# Patient Record
Sex: FEMALE | Race: WHITE | Employment: UNEMPLOYED | URBAN - METROPOLITAN AREA
[De-identification: names, ages, dates, MRNs, and addresses within clinical notes are randomized per-mention and may not be internally consistent; named-entity substitution may affect disease eponyms.]

---

## 2017-02-23 ENCOUNTER — TELEPHONE (OUTPATIENT)
Dept: FAMILY MEDICINE CLINIC | Age: 42
End: 2017-02-23

## 2017-02-24 ENCOUNTER — OFFICE VISIT (OUTPATIENT)
Dept: FAMILY MEDICINE CLINIC | Age: 42
End: 2017-02-24

## 2017-02-24 VITALS
SYSTOLIC BLOOD PRESSURE: 142 MMHG | OXYGEN SATURATION: 98 % | HEIGHT: 64 IN | DIASTOLIC BLOOD PRESSURE: 96 MMHG | HEART RATE: 103 BPM | WEIGHT: 227 LBS | BODY MASS INDEX: 38.76 KG/M2

## 2017-02-24 DIAGNOSIS — B37.31 VAGINAL YEAST INFECTION: ICD-10-CM

## 2017-02-24 DIAGNOSIS — E78.00 PURE HYPERCHOLESTEROLEMIA: ICD-10-CM

## 2017-02-24 DIAGNOSIS — E11.8 TYPE 2 DIABETES MELLITUS WITH COMPLICATION, UNSPECIFIED LONG TERM INSULIN USE STATUS: Primary | ICD-10-CM

## 2017-02-24 DIAGNOSIS — I10 ESSENTIAL HYPERTENSION: ICD-10-CM

## 2017-02-24 DIAGNOSIS — F33.0 MILD EPISODE OF RECURRENT MAJOR DEPRESSIVE DISORDER (HCC): ICD-10-CM

## 2017-02-24 LAB — HBA1C MFR BLD: 12.5 %

## 2017-02-24 PROCEDURE — 99215 OFFICE O/P EST HI 40 MIN: CPT | Performed by: NURSE PRACTITIONER

## 2017-02-24 PROCEDURE — 83036 HEMOGLOBIN GLYCOSYLATED A1C: CPT | Performed by: NURSE PRACTITIONER

## 2017-02-24 RX ORDER — LANCETS
1 EACH MISCELLANEOUS DAILY
Qty: 100 EACH | Refills: 11 | Status: SHIPPED | OUTPATIENT
Start: 2017-02-24 | End: 2019-01-17

## 2017-02-24 RX ORDER — FLUCONAZOLE 150 MG/1
150 TABLET ORAL ONCE
Qty: 1 TABLET | Refills: 0 | Status: SHIPPED | OUTPATIENT
Start: 2017-02-24 | End: 2017-02-24

## 2017-02-24 RX ORDER — ATORVASTATIN CALCIUM 20 MG/1
20 TABLET, FILM COATED ORAL DAILY
Qty: 30 TABLET | Refills: 5 | Status: SHIPPED | OUTPATIENT
Start: 2017-02-24 | End: 2017-03-31 | Stop reason: SDDI

## 2017-02-24 RX ORDER — LISINOPRIL 20 MG/1
20 TABLET ORAL DAILY
Qty: 30 TABLET | Refills: 5 | Status: SHIPPED | OUTPATIENT
Start: 2017-02-24 | End: 2017-03-31 | Stop reason: SDDI

## 2017-02-24 RX ORDER — BLOOD-GLUCOSE METER
1 KIT MISCELLANEOUS ONCE
Qty: 1 KIT | Refills: 0 | Status: SHIPPED | OUTPATIENT
Start: 2017-02-24 | End: 2019-01-17 | Stop reason: SDUPTHER

## 2017-02-27 DIAGNOSIS — E78.00 PURE HYPERCHOLESTEROLEMIA: ICD-10-CM

## 2017-02-27 DIAGNOSIS — E11.8 TYPE 2 DIABETES MELLITUS WITH COMPLICATION, UNSPECIFIED LONG TERM INSULIN USE STATUS: ICD-10-CM

## 2017-02-27 LAB
A/G RATIO: 1.1 (ref 1.1–2.2)
ALBUMIN SERPL-MCNC: 3.3 G/DL (ref 3.4–5)
ALP BLD-CCNC: 95 U/L (ref 40–129)
ALT SERPL-CCNC: 17 U/L (ref 10–40)
ANION GAP SERPL CALCULATED.3IONS-SCNC: 13 MMOL/L (ref 3–16)
AST SERPL-CCNC: 13 U/L (ref 15–37)
BILIRUB SERPL-MCNC: 0.4 MG/DL (ref 0–1)
BUN BLDV-MCNC: 17 MG/DL (ref 7–20)
CALCIUM SERPL-MCNC: 9.1 MG/DL (ref 8.3–10.6)
CHLORIDE BLD-SCNC: 102 MMOL/L (ref 99–110)
CHOLESTEROL, TOTAL: 251 MG/DL (ref 0–199)
CO2: 24 MMOL/L (ref 21–32)
CREAT SERPL-MCNC: 0.7 MG/DL (ref 0.6–1.1)
CREATININE URINE: 82.2 MG/DL (ref 28–259)
GFR AFRICAN AMERICAN: >60
GFR NON-AFRICAN AMERICAN: >60
GLOBULIN: 2.9 G/DL
GLUCOSE BLD-MCNC: 275 MG/DL (ref 70–99)
HDLC SERPL-MCNC: 50 MG/DL (ref 40–60)
LDL CHOLESTEROL CALCULATED: 147 MG/DL
MICROALBUMIN UR-MCNC: 394.4 MG/DL
MICROALBUMIN/CREAT UR-RTO: 4798.1 MG/G (ref 0–30)
POTASSIUM SERPL-SCNC: 4.4 MMOL/L (ref 3.5–5.1)
SODIUM BLD-SCNC: 139 MMOL/L (ref 136–145)
TOTAL PROTEIN: 6.2 G/DL (ref 6.4–8.2)
TRIGL SERPL-MCNC: 272 MG/DL (ref 0–150)
VLDLC SERPL CALC-MCNC: 54 MG/DL

## 2017-02-28 ENCOUNTER — TELEPHONE (OUTPATIENT)
Dept: FAMILY MEDICINE CLINIC | Age: 42
End: 2017-02-28

## 2017-03-31 ENCOUNTER — OFFICE VISIT (OUTPATIENT)
Dept: FAMILY MEDICINE CLINIC | Age: 42
End: 2017-03-31

## 2017-03-31 VITALS
SYSTOLIC BLOOD PRESSURE: 168 MMHG | WEIGHT: 224 LBS | BODY MASS INDEX: 37.32 KG/M2 | HEART RATE: 101 BPM | DIASTOLIC BLOOD PRESSURE: 98 MMHG | HEIGHT: 65 IN | OXYGEN SATURATION: 98 %

## 2017-03-31 DIAGNOSIS — Z79.4 TYPE 2 DIABETES MELLITUS WITH COMPLICATION, WITH LONG-TERM CURRENT USE OF INSULIN (HCC): ICD-10-CM

## 2017-03-31 DIAGNOSIS — E11.8 TYPE 2 DIABETES MELLITUS WITH COMPLICATION, WITH LONG-TERM CURRENT USE OF INSULIN (HCC): ICD-10-CM

## 2017-03-31 DIAGNOSIS — A60.04 HERPES SIMPLEX VULVOVAGINITIS: ICD-10-CM

## 2017-03-31 DIAGNOSIS — F33.0 MILD EPISODE OF RECURRENT MAJOR DEPRESSIVE DISORDER (HCC): ICD-10-CM

## 2017-03-31 DIAGNOSIS — E78.2 MIXED HYPERLIPIDEMIA: ICD-10-CM

## 2017-03-31 DIAGNOSIS — I10 ESSENTIAL HYPERTENSION: Primary | ICD-10-CM

## 2017-03-31 LAB
ANION GAP SERPL CALCULATED.3IONS-SCNC: 14 MMOL/L (ref 3–16)
BUN BLDV-MCNC: 12 MG/DL (ref 7–20)
CALCIUM SERPL-MCNC: 9.3 MG/DL (ref 8.3–10.6)
CHLORIDE BLD-SCNC: 100 MMOL/L (ref 99–110)
CO2: 20 MMOL/L (ref 21–32)
CREAT SERPL-MCNC: 0.5 MG/DL (ref 0.6–1.1)
CREATININE URINE: 44.2 MG/DL (ref 28–259)
GFR AFRICAN AMERICAN: >60
GFR NON-AFRICAN AMERICAN: >60
GLUCOSE BLD-MCNC: 285 MG/DL (ref 70–99)
MICROALBUMIN UR-MCNC: 149.4 MG/DL
MICROALBUMIN/CREAT UR-RTO: 3380.1 MG/G (ref 0–30)
POTASSIUM SERPL-SCNC: 4.5 MMOL/L (ref 3.5–5.1)
SODIUM BLD-SCNC: 134 MMOL/L (ref 136–145)

## 2017-03-31 PROCEDURE — 99214 OFFICE O/P EST MOD 30 MIN: CPT | Performed by: NURSE PRACTITIONER

## 2017-03-31 RX ORDER — METOPROLOL TARTRATE AND HYDROCHLOROTHIAZIDE 50; 25 MG/1; MG/1
1 TABLET ORAL DAILY
Qty: 30 TABLET | Refills: 3 | Status: SHIPPED | OUTPATIENT
Start: 2017-03-31 | End: 2017-03-31 | Stop reason: CLARIF

## 2017-03-31 RX ORDER — ESCITALOPRAM OXALATE 5 MG/1
5 TABLET ORAL DAILY
COMMUNITY
End: 2018-08-28 | Stop reason: SDUPTHER

## 2017-03-31 RX ORDER — VALACYCLOVIR HYDROCHLORIDE 500 MG/1
500 TABLET, FILM COATED ORAL 2 TIMES DAILY PRN
Qty: 30 TABLET | Refills: 2 | Status: SHIPPED | OUTPATIENT
Start: 2017-03-31 | End: 2018-08-23 | Stop reason: SDUPTHER

## 2017-03-31 RX ORDER — COLESEVELAM HYDROCHLORIDE 3.75 G/1
3.75 POWDER, FOR SUSPENSION ORAL DAILY
Qty: 30 EACH | Refills: 3 | Status: SHIPPED | OUTPATIENT
Start: 2017-03-31 | End: 2017-07-24 | Stop reason: SDUPTHER

## 2017-03-31 RX ORDER — METOPROLOL SUCCINATE AND HYDROCHLOROTHIAZIDE 12.5; 5 MG/1; MG/1
1 TABLET ORAL DAILY
Qty: 30 TABLET | Refills: 3 | Status: SHIPPED | OUTPATIENT
Start: 2017-03-31 | End: 2017-07-24

## 2017-04-01 LAB
ESTIMATED AVERAGE GLUCOSE: 277.6 MG/DL
HBA1C MFR BLD: 11.3 %

## 2017-04-06 RX ORDER — METOPROLOL TARTRATE 50 MG/1
50 TABLET, FILM COATED ORAL DAILY
Qty: 30 TABLET | Refills: 3 | Status: SHIPPED | OUTPATIENT
Start: 2017-04-06 | End: 2017-07-24 | Stop reason: SDUPTHER

## 2017-04-06 RX ORDER — HYDROCHLOROTHIAZIDE 25 MG/1
12.5 TABLET ORAL DAILY
Qty: 30 TABLET | Refills: 3 | Status: SHIPPED | OUTPATIENT
Start: 2017-04-06 | End: 2017-07-24 | Stop reason: SDUPTHER

## 2017-07-24 ENCOUNTER — OFFICE VISIT (OUTPATIENT)
Dept: FAMILY MEDICINE CLINIC | Age: 42
End: 2017-07-24

## 2017-07-24 VITALS
OXYGEN SATURATION: 99 % | SYSTOLIC BLOOD PRESSURE: 138 MMHG | WEIGHT: 217 LBS | BODY MASS INDEX: 36.11 KG/M2 | HEART RATE: 98 BPM | DIASTOLIC BLOOD PRESSURE: 96 MMHG

## 2017-07-24 DIAGNOSIS — F31.70 BIPOLAR DISORDER IN FULL REMISSION, MOST RECENT EPISODE UNSPECIFIED TYPE (HCC): ICD-10-CM

## 2017-07-24 DIAGNOSIS — F41.9 ANXIETY: ICD-10-CM

## 2017-07-24 DIAGNOSIS — I10 ESSENTIAL HYPERTENSION: ICD-10-CM

## 2017-07-24 DIAGNOSIS — E78.2 MIXED HYPERLIPIDEMIA: ICD-10-CM

## 2017-07-24 DIAGNOSIS — E78.00 PURE HYPERCHOLESTEROLEMIA: ICD-10-CM

## 2017-07-24 DIAGNOSIS — Z79.4 TYPE 2 DIABETES MELLITUS WITH COMPLICATION, WITH LONG-TERM CURRENT USE OF INSULIN (HCC): Primary | ICD-10-CM

## 2017-07-24 DIAGNOSIS — E11.8 TYPE 2 DIABETES MELLITUS WITH COMPLICATION, UNSPECIFIED LONG TERM INSULIN USE STATUS: ICD-10-CM

## 2017-07-24 DIAGNOSIS — Z30.015 ENCOUNTER FOR INITIAL PRESCRIPTION OF VAGINAL RING HORMONAL CONTRACEPTIVE: ICD-10-CM

## 2017-07-24 DIAGNOSIS — E11.8 TYPE 2 DIABETES MELLITUS WITH COMPLICATION, WITH LONG-TERM CURRENT USE OF INSULIN (HCC): Primary | ICD-10-CM

## 2017-07-24 LAB — HBA1C MFR BLD: 7.3 %

## 2017-07-24 PROCEDURE — 83036 HEMOGLOBIN GLYCOSYLATED A1C: CPT | Performed by: NURSE PRACTITIONER

## 2017-07-24 PROCEDURE — 99214 OFFICE O/P EST MOD 30 MIN: CPT | Performed by: NURSE PRACTITIONER

## 2017-07-24 RX ORDER — LISINOPRIL 10 MG/1
10 TABLET ORAL
COMMUNITY
Start: 2017-06-28 | End: 2017-07-24

## 2017-07-24 RX ORDER — ASPIRIN 81 MG/1
81 TABLET ORAL DAILY
Qty: 30 TABLET | Refills: 3 | Status: SHIPPED | OUTPATIENT
Start: 2017-07-24 | End: 2019-01-17 | Stop reason: SDUPTHER

## 2017-07-24 RX ORDER — ETONOGESTREL AND ETHINYL ESTRADIOL 11.7; 2.7 MG/1; MG/1
1 INSERT, EXTENDED RELEASE VAGINAL
Qty: 3 EACH | Refills: 3 | Status: SHIPPED | OUTPATIENT
Start: 2017-07-24 | End: 2019-01-17 | Stop reason: ALTCHOICE

## 2017-07-24 RX ORDER — HYDROCHLOROTHIAZIDE 25 MG/1
12.5 TABLET ORAL DAILY
Qty: 30 TABLET | Refills: 3 | Status: SHIPPED | OUTPATIENT
Start: 2017-07-24 | End: 2018-02-28 | Stop reason: SDUPTHER

## 2017-07-24 RX ORDER — METOPROLOL TARTRATE 50 MG/1
50 TABLET, FILM COATED ORAL DAILY
Qty: 30 TABLET | Refills: 3 | Status: SHIPPED | OUTPATIENT
Start: 2017-07-24 | End: 2018-02-28 | Stop reason: SDUPTHER

## 2017-07-24 RX ORDER — COLESEVELAM HYDROCHLORIDE 3.75 G/1
3.75 POWDER, FOR SUSPENSION ORAL DAILY
Qty: 30 EACH | Refills: 3 | Status: SHIPPED | OUTPATIENT
Start: 2017-07-24 | End: 2018-02-28 | Stop reason: SDUPTHER

## 2018-02-28 ENCOUNTER — OFFICE VISIT (OUTPATIENT)
Dept: FAMILY MEDICINE CLINIC | Age: 43
End: 2018-02-28

## 2018-02-28 VITALS
HEIGHT: 65 IN | OXYGEN SATURATION: 98 % | RESPIRATION RATE: 10 BRPM | WEIGHT: 220.8 LBS | DIASTOLIC BLOOD PRESSURE: 90 MMHG | BODY MASS INDEX: 36.79 KG/M2 | HEART RATE: 98 BPM | SYSTOLIC BLOOD PRESSURE: 182 MMHG

## 2018-02-28 DIAGNOSIS — F43.10 PTSD (POST-TRAUMATIC STRESS DISORDER): ICD-10-CM

## 2018-02-28 DIAGNOSIS — E78.2 MIXED HYPERLIPIDEMIA: ICD-10-CM

## 2018-02-28 DIAGNOSIS — E11.8 TYPE 2 DIABETES MELLITUS WITH COMPLICATION, UNSPECIFIED LONG TERM INSULIN USE STATUS: ICD-10-CM

## 2018-02-28 DIAGNOSIS — F31.70 BIPOLAR DISORDER IN FULL REMISSION, MOST RECENT EPISODE UNSPECIFIED TYPE (HCC): ICD-10-CM

## 2018-02-28 DIAGNOSIS — G62.9 NEUROPATHY: ICD-10-CM

## 2018-02-28 DIAGNOSIS — G43.719 INTRACTABLE CHRONIC MIGRAINE WITHOUT AURA AND WITHOUT STATUS MIGRAINOSUS: ICD-10-CM

## 2018-02-28 DIAGNOSIS — Z79.4 TYPE 2 DIABETES MELLITUS WITH COMPLICATION, WITH LONG-TERM CURRENT USE OF INSULIN (HCC): ICD-10-CM

## 2018-02-28 DIAGNOSIS — E11.8 TYPE 2 DIABETES MELLITUS WITH COMPLICATION, WITH LONG-TERM CURRENT USE OF INSULIN (HCC): ICD-10-CM

## 2018-02-28 DIAGNOSIS — I10 ESSENTIAL HYPERTENSION: Primary | ICD-10-CM

## 2018-02-28 LAB — HBA1C MFR BLD: 12.1 %

## 2018-02-28 PROCEDURE — 96372 THER/PROPH/DIAG INJ SC/IM: CPT | Performed by: NURSE PRACTITIONER

## 2018-02-28 PROCEDURE — 83036 HEMOGLOBIN GLYCOSYLATED A1C: CPT | Performed by: NURSE PRACTITIONER

## 2018-02-28 PROCEDURE — G8484 FLU IMMUNIZE NO ADMIN: HCPCS | Performed by: NURSE PRACTITIONER

## 2018-02-28 PROCEDURE — G8417 CALC BMI ABV UP PARAM F/U: HCPCS | Performed by: NURSE PRACTITIONER

## 2018-02-28 PROCEDURE — 3046F HEMOGLOBIN A1C LEVEL >9.0%: CPT | Performed by: NURSE PRACTITIONER

## 2018-02-28 PROCEDURE — 99215 OFFICE O/P EST HI 40 MIN: CPT | Performed by: NURSE PRACTITIONER

## 2018-02-28 PROCEDURE — 4004F PT TOBACCO SCREEN RCVD TLK: CPT | Performed by: NURSE PRACTITIONER

## 2018-02-28 PROCEDURE — G8427 DOCREV CUR MEDS BY ELIG CLIN: HCPCS | Performed by: NURSE PRACTITIONER

## 2018-02-28 RX ORDER — METOPROLOL TARTRATE 50 MG/1
50 TABLET, FILM COATED ORAL 2 TIMES DAILY
Qty: 60 TABLET | Refills: 3 | Status: SHIPPED | OUTPATIENT
Start: 2018-02-28 | End: 2018-08-28

## 2018-02-28 RX ORDER — GABAPENTIN 100 MG/1
100 CAPSULE ORAL 2 TIMES DAILY
Qty: 60 CAPSULE | Refills: 0 | Status: SHIPPED | OUTPATIENT
Start: 2018-02-28 | End: 2018-04-26 | Stop reason: SDUPTHER

## 2018-02-28 RX ORDER — HYDROCHLOROTHIAZIDE 25 MG/1
25 TABLET ORAL DAILY
Qty: 30 TABLET | Refills: 3 | Status: SHIPPED | OUTPATIENT
Start: 2018-02-28 | End: 2018-08-28 | Stop reason: SDUPTHER

## 2018-02-28 RX ORDER — KETOROLAC TROMETHAMINE 30 MG/ML
60 INJECTION, SOLUTION INTRAMUSCULAR; INTRAVENOUS ONCE
Status: COMPLETED | OUTPATIENT
Start: 2018-02-28 | End: 2018-02-28

## 2018-02-28 RX ORDER — COLESEVELAM HYDROCHLORIDE 3.75 G/1
3.75 POWDER, FOR SUSPENSION ORAL DAILY
Qty: 30 EACH | Refills: 3 | Status: SHIPPED | OUTPATIENT
Start: 2018-02-28 | End: 2018-08-28

## 2018-02-28 RX ADMIN — KETOROLAC TROMETHAMINE 60 MG: 30 INJECTION, SOLUTION INTRAMUSCULAR; INTRAVENOUS at 15:18

## 2018-02-28 ASSESSMENT — ENCOUNTER SYMPTOMS
CHEST TIGHTNESS: 0
VOMITING: 0
BLOOD IN STOOL: 0
SINUS PRESSURE: 0
NAUSEA: 0
EYE REDNESS: 0
ABDOMINAL DISTENTION: 0
BACK PAIN: 0
DIARRHEA: 0
COUGH: 0
ABDOMINAL PAIN: 0
WHEEZING: 0
APNEA: 0
SHORTNESS OF BREATH: 0
EYE PAIN: 0
RHINORRHEA: 0
CONSTIPATION: 0

## 2018-02-28 NOTE — PROGRESS NOTES
Social History Main Topics    Smoking status: Light Tobacco Smoker    Smokeless tobacco: Never Used      Comment: occasionally    Alcohol use 0.0 oz/week      Comment: socially    Drug use: No    Sexual activity: Not Currently     Partners: Male     Other Topics Concern    Not on file     Social History Narrative    2016    Works at [employer]        2015    . [place] surfing. Lost job after mom passed away 2015. Has 3 living kids. Child  in - born . Hx of 13 miscarriages. Hx of working as a  at [employer] including solutions. Family History   Problem Relation Age of Onset    Mental Illness Mother     Diabetes Mother     Mental Illness Father     Diabetes Father     Diabetes Maternal Grandmother     Mental Illness Paternal Grandmother     Diabetes Paternal Grandmother        Current Outpatient Prescriptions   Medication Sig Dispense Refill    Acetaminophen (TYLENOL PO) Take by mouth      Ibuprofen (MOTRIN PO) Take by mouth      hydrochlorothiazide (HYDRODIURIL) 25 MG tablet Take 1 tablet by mouth daily 30 tablet 3    metoprolol tartrate (LOPRESSOR) 50 MG tablet Take 1 tablet by mouth 2 times daily 60 tablet 3    colesevelam (WELCHOL) 3.75 g PACK powder Take 1 packet by mouth daily 30 each 3    gabapentin (NEURONTIN) 100 MG capsule Take 1 capsule by mouth 2 times daily for 30 days.  60 capsule 0    metFORMIN (GLUCOPHAGE) 1000 MG tablet Take 1 tablet by mouth 2 times daily (with meals) 60 tablet 3    SITagliptin (JANUVIA) 100 MG tablet Take 1 tablet by mouth daily 30 tablet 3    escitalopram (LEXAPRO) 5 MG tablet Take 5 mg by mouth daily      valACYclovir (VALTREX) 500 MG tablet Take 1 tablet by mouth 2 times daily as needed (herpes out break) 30 tablet 2    KROGER LANCETS MISC 1 each by Does not apply route daily 100 each 11    glucose monitoring kit (FREESTYLE) monitoring kit 1 each by Does not apply route once for 1 Negative for agitation, behavioral problems and confusion. The patient is not nervous/anxious. BP (!) 182/90 (Site: Left Arm, Position: Sitting, Cuff Size: Large Adult)   Pulse 98   Resp 10   Ht 5' 5\" (1.651 m)   Wt 220 lb 12.8 oz (100.2 kg)   LMP 02/07/2018 (Approximate)   SpO2 98%   BMI 36.74 kg/m²     Physical Exam   Constitutional: She is oriented to person, place, and time. She appears well-developed and well-nourished. No distress. HENT:   Head: Normocephalic and atraumatic. Mouth/Throat: No oropharyngeal exudate. Eyes: Conjunctivae are normal. No scleral icterus. Neck: Normal range of motion. Neck supple. No tracheal deviation present. Cardiovascular: Normal rate, regular rhythm, normal heart sounds and intact distal pulses. Exam reveals no gallop and no friction rub. No murmur heard. Pulmonary/Chest: Effort normal and breath sounds normal. No respiratory distress. She has no wheezes. She has no rales. She exhibits no tenderness. Abdominal: Soft. Bowel sounds are normal. She exhibits no distension. There is no tenderness. Musculoskeletal: Normal range of motion. She exhibits no edema, tenderness or deformity. Lymphadenopathy:     She has no cervical adenopathy. Neurological: She is alert and oriented to person, place, and time. Coordination normal.   Abnormal monofilament bilateral feet. No sores or callus. Tenderness to touch     Skin: Skin is warm and dry. No rash noted. She is not diaphoretic. No erythema. No pallor. Psychiatric: She has a normal mood and affect. Thought content normal.       Diagnosis  Assessment and Plan  1. Essential hypertension  Uncontrolled. Restart medications. Labs. Compliance discussed in detail.   - COMPREHENSIVE METABOLIC PANEL; Future  - hydrochlorothiazide (HYDRODIURIL) 25 MG tablet; Take 1 tablet by mouth daily  Dispense: 30 tablet; Refill: 3  - metoprolol tartrate (LOPRESSOR) 50 MG tablet;  Take 1 tablet by mouth 2 times daily

## 2018-04-26 ENCOUNTER — TELEPHONE (OUTPATIENT)
Dept: FAMILY MEDICINE CLINIC | Age: 43
End: 2018-04-26

## 2018-04-26 DIAGNOSIS — G62.9 NEUROPATHY: ICD-10-CM

## 2018-04-26 RX ORDER — FLUCONAZOLE 150 MG/1
150 TABLET ORAL ONCE
Qty: 2 TABLET | Refills: 0 | Status: SHIPPED | OUTPATIENT
Start: 2018-04-26 | End: 2018-04-26

## 2018-04-27 RX ORDER — GABAPENTIN 100 MG/1
CAPSULE ORAL
Qty: 60 CAPSULE | Refills: 0 | Status: SHIPPED | OUTPATIENT
Start: 2018-04-27 | End: 2019-06-13

## 2018-05-01 ENCOUNTER — TELEPHONE (OUTPATIENT)
Dept: FAMILY MEDICINE CLINIC | Age: 43
End: 2018-05-01

## 2018-05-09 ENCOUNTER — TELEPHONE (OUTPATIENT)
Dept: FAMILY MEDICINE CLINIC | Age: 43
End: 2018-05-09

## 2018-05-14 ENCOUNTER — TELEPHONE (OUTPATIENT)
Dept: FAMILY MEDICINE CLINIC | Age: 43
End: 2018-05-14

## 2018-08-23 DIAGNOSIS — A60.04 HERPES SIMPLEX VULVOVAGINITIS: ICD-10-CM

## 2018-08-23 NOTE — TELEPHONE ENCOUNTER
Last Fill 3/31/17  Last Office Visit 2/28/18   Return in about 4 weeks (around 3/28/2018) for chronic conditions .    No Pending Appointments

## 2018-08-24 RX ORDER — VALACYCLOVIR HYDROCHLORIDE 500 MG/1
500 TABLET, FILM COATED ORAL 2 TIMES DAILY PRN
Qty: 30 TABLET | Refills: 1 | Status: SHIPPED | OUTPATIENT
Start: 2018-08-24 | End: 2019-01-17 | Stop reason: SDUPTHER

## 2018-08-28 ENCOUNTER — OFFICE VISIT (OUTPATIENT)
Dept: FAMILY MEDICINE CLINIC | Age: 43
End: 2018-08-28

## 2018-08-28 VITALS
SYSTOLIC BLOOD PRESSURE: 148 MMHG | OXYGEN SATURATION: 99 % | HEIGHT: 65 IN | DIASTOLIC BLOOD PRESSURE: 94 MMHG | BODY MASS INDEX: 36.46 KG/M2 | HEART RATE: 63 BPM | WEIGHT: 218.8 LBS

## 2018-08-28 DIAGNOSIS — I10 ESSENTIAL HYPERTENSION: ICD-10-CM

## 2018-08-28 DIAGNOSIS — Z79.4 TYPE 2 DIABETES MELLITUS WITH COMPLICATION, WITH LONG-TERM CURRENT USE OF INSULIN (HCC): ICD-10-CM

## 2018-08-28 DIAGNOSIS — E78.2 MIXED HYPERLIPIDEMIA: ICD-10-CM

## 2018-08-28 DIAGNOSIS — G56.03 BILATERAL CARPAL TUNNEL SYNDROME: ICD-10-CM

## 2018-08-28 DIAGNOSIS — F31.70 BIPOLAR DISORDER IN FULL REMISSION, MOST RECENT EPISODE UNSPECIFIED TYPE (HCC): ICD-10-CM

## 2018-08-28 DIAGNOSIS — A60.04 HERPES SIMPLEX VULVOVAGINITIS: Primary | ICD-10-CM

## 2018-08-28 DIAGNOSIS — E11.8 TYPE 2 DIABETES MELLITUS WITH COMPLICATION, WITH LONG-TERM CURRENT USE OF INSULIN (HCC): ICD-10-CM

## 2018-08-28 DIAGNOSIS — B37.31 VAGINAL YEAST INFECTION: ICD-10-CM

## 2018-08-28 LAB
A/G RATIO: 1.3 (ref 1.1–2.2)
ALBUMIN SERPL-MCNC: 3.5 G/DL (ref 3.4–5)
ALP BLD-CCNC: 101 U/L (ref 40–129)
ALT SERPL-CCNC: 16 U/L (ref 10–40)
ANION GAP SERPL CALCULATED.3IONS-SCNC: 13 MMOL/L (ref 3–16)
AST SERPL-CCNC: 13 U/L (ref 15–37)
BILIRUB SERPL-MCNC: <0.2 MG/DL (ref 0–1)
BUN BLDV-MCNC: 24 MG/DL (ref 7–20)
CALCIUM SERPL-MCNC: 9.7 MG/DL (ref 8.3–10.6)
CHLORIDE BLD-SCNC: 102 MMOL/L (ref 99–110)
CHOLESTEROL, TOTAL: 261 MG/DL (ref 0–199)
CO2: 21 MMOL/L (ref 21–32)
CREAT SERPL-MCNC: 0.8 MG/DL (ref 0.6–1.1)
CREATININE URINE: 69.2 MG/DL (ref 28–259)
GFR AFRICAN AMERICAN: >60
GFR NON-AFRICAN AMERICAN: >60
GLOBULIN: 2.6 G/DL
GLUCOSE BLD-MCNC: 289 MG/DL (ref 70–99)
HDLC SERPL-MCNC: 46 MG/DL (ref 40–60)
LDL CHOLESTEROL CALCULATED: ABNORMAL MG/DL
LDL CHOLESTEROL DIRECT: 171 MG/DL
MICROALBUMIN UR-MCNC: 284.7 MG/DL
MICROALBUMIN/CREAT UR-RTO: 4114.2 MG/G (ref 0–30)
POTASSIUM SERPL-SCNC: 4.8 MMOL/L (ref 3.5–5.1)
SODIUM BLD-SCNC: 136 MMOL/L (ref 136–145)
TOTAL PROTEIN: 6.1 G/DL (ref 6.4–8.2)
TRIGL SERPL-MCNC: 385 MG/DL (ref 0–150)
VLDLC SERPL CALC-MCNC: ABNORMAL MG/DL

## 2018-08-28 PROCEDURE — 2022F DILAT RTA XM EVC RTNOPTHY: CPT | Performed by: NURSE PRACTITIONER

## 2018-08-28 PROCEDURE — 3046F HEMOGLOBIN A1C LEVEL >9.0%: CPT | Performed by: NURSE PRACTITIONER

## 2018-08-28 PROCEDURE — G8427 DOCREV CUR MEDS BY ELIG CLIN: HCPCS | Performed by: NURSE PRACTITIONER

## 2018-08-28 PROCEDURE — 4004F PT TOBACCO SCREEN RCVD TLK: CPT | Performed by: NURSE PRACTITIONER

## 2018-08-28 PROCEDURE — G8417 CALC BMI ABV UP PARAM F/U: HCPCS | Performed by: NURSE PRACTITIONER

## 2018-08-28 PROCEDURE — 99215 OFFICE O/P EST HI 40 MIN: CPT | Performed by: NURSE PRACTITIONER

## 2018-08-28 RX ORDER — LOSARTAN POTASSIUM 50 MG/1
50 TABLET ORAL DAILY
Qty: 90 TABLET | Refills: 1 | Status: SHIPPED | OUTPATIENT
Start: 2018-08-28 | End: 2019-01-17 | Stop reason: SDUPTHER

## 2018-08-28 RX ORDER — HYDROCHLOROTHIAZIDE 25 MG/1
25 TABLET ORAL DAILY
Qty: 90 TABLET | Refills: 1 | Status: SHIPPED | OUTPATIENT
Start: 2018-08-28 | End: 2019-01-17 | Stop reason: SDUPTHER

## 2018-08-28 RX ORDER — ATORVASTATIN CALCIUM 40 MG/1
40 TABLET, FILM COATED ORAL DAILY
Qty: 90 TABLET | Refills: 1 | Status: SHIPPED | OUTPATIENT
Start: 2018-08-28 | End: 2019-01-17 | Stop reason: SDUPTHER

## 2018-08-28 RX ORDER — ESCITALOPRAM OXALATE 5 MG/1
5 TABLET ORAL DAILY
Qty: 90 TABLET | Refills: 1 | Status: SHIPPED | OUTPATIENT
Start: 2018-08-28 | End: 2019-01-17 | Stop reason: SDUPTHER

## 2018-08-28 RX ORDER — FLUCONAZOLE 150 MG/1
150 TABLET ORAL ONCE
Qty: 2 TABLET | Refills: 0 | Status: SHIPPED | OUTPATIENT
Start: 2018-08-28 | End: 2018-08-28

## 2018-08-28 ASSESSMENT — ENCOUNTER SYMPTOMS
COUGH: 0
CONSTIPATION: 0
NAUSEA: 0
ABDOMINAL PAIN: 0
CHEST TIGHTNESS: 0
APNEA: 0
RHINORRHEA: 0
DIARRHEA: 0
WHEEZING: 0
EYE PAIN: 0
ABDOMINAL DISTENTION: 0
VOMITING: 0
SINUS PRESSURE: 0
BLOOD IN STOOL: 0
SHORTNESS OF BREATH: 0
BACK PAIN: 0
EYE REDNESS: 0

## 2018-08-28 NOTE — PROGRESS NOTES
HPI:  8/28/2018    This is a 40 y.o. female   Chief Complaint   Patient presents with    Herpes Zoster    Vaginitis     HPI    Dm Raymundo returns for follow up of hypertension. Patient is taking HCTZ but not taking metoprolol. Patient's blood pressure is not controlled. Side effects related to taking the medications include no medication side effects noted. Metoprolol made patient tired. Patient returns for follow up of hyperlipidemia. Patient has not been taking Her medications as prescribed. Patient's lipids are not controlled. She has not had clinical consequences related to hyperlipidemia including, but not limited to acute coronary syndrome, stroke or chronic kidney disease. Patient returns to the office for follow up of his diabetes. Herlast hemoglobin A1c was 12.1. She is not compliant with Her medications also admits to a lot of dietary noncompliance. Patient has no symptoms related to his condition such as blurred vision, slurred speech, chest pain or shortness of breath. Not taking [place] and [place]. Is taking metformin. Has not seen endocrinology in a few years. Yeast infection  Thick white discharge. Started while on vacation  Treated for URI with abx. Bilateral hand pain  Taking ibuprofen for pain   Has not tried to splint   Wakes her up at night.   Getting more frequent  Does lots of repetitive motion    BP (!) 148/94 (Site: Right Arm, Position: Sitting, Cuff Size: Large Adult)   Pulse 63   Ht 5' 5\" (1.651 m)   Wt 218 lb 12.8 oz (99.2 kg)   LMP 08/01/2018   SpO2 99%   BMI 36.41 kg/m²     Allergies   Allergen Reactions    Ambien [Zolpidem Tartrate] Other (See Comments)     Driving without realizing it    Percocet [Oxycodone-Acetaminophen] Hives       Current Outpatient Prescriptions   Medication Sig Dispense Refill    losartan (COZAAR) 50 MG tablet Take 1 tablet by mouth daily 90 tablet 1    metFORMIN (GLUCOPHAGE) 1000 MG tablet Take 1 tablet by mouth 2 times daily and vomiting. Endocrine: Negative for cold intolerance, heat intolerance, polydipsia, polyphagia and polyuria. Genitourinary: Positive for vaginal discharge. Negative for difficulty urinating, dysuria, enuresis, frequency, hematuria and urgency. Musculoskeletal: Positive for arthralgias. Negative for back pain, gait problem, joint swelling and neck pain. Skin: Negative for rash and wound. Allergic/Immunologic: Negative for environmental allergies, food allergies and immunocompromised state. Neurological: Negative for dizziness, syncope, light-headedness, numbness and headaches. Hematological: Negative for adenopathy. Does not bruise/bleed easily. Psychiatric/Behavioral: Negative for agitation, behavioral problems and confusion. The patient is not nervous/anxious. Physical Exam   Constitutional: She is oriented to person, place, and time. She appears well-developed and well-nourished. No distress. HENT:   Head: Normocephalic and atraumatic. Eyes: Conjunctivae are normal. No scleral icterus. Neck: Normal range of motion. Neck supple. No tracheal deviation present. Cardiovascular: Normal rate, regular rhythm, normal heart sounds and intact distal pulses. Exam reveals no gallop and no friction rub. No murmur heard. Pulmonary/Chest: Effort normal and breath sounds normal. No respiratory distress. She has no wheezes. She has no rales. She exhibits no tenderness. Musculoskeletal: Normal range of motion. She exhibits no edema.   + Phalen test   Neurological: She is alert and oriented to person, place, and time. Coordination normal.   Bilateral feet free from callous and sore. Decreased sensation with monofilament. Skin: Skin is warm and dry. No rash noted. She is not diaphoretic. No erythema. No pallor. Psychiatric: She has a normal mood and affect. Thought content normal.       Assessment/Plan:  1. Herpes simplex vulvovaginitis  Stable, controlled with prn antiviral    2.  Essential hypertension  Uncontrolled. Did not tolerate beta blocker. Will try arb. Using nuvaring for contraception.   - losartan (COZAAR) 50 MG tablet; Take 1 tablet by mouth daily  Dispense: 90 tablet; Refill: 1  - Comprehensive Metabolic Panel; Future  - hydrochlorothiazide (HYDRODIURIL) 25 MG tablet; Take 1 tablet by mouth daily  Dispense: 90 tablet; Refill: 1    3. Type 2 diabetes mellitus with complication, with long-term current use of insulin (HCC)  Uncontrolled. Continue metformin. Discussed starting insulin. Checking labs today. Reviewed foot care. - Hemoglobin A1C; Future  - Microalbumin / Creatinine Urine Ratio; Future  -  DIABETES FOOT EXAM  - metFORMIN (GLUCOPHAGE) 1000 MG tablet; Take 1 tablet by mouth 2 times daily (with meals)  Dispense: 180 tablet; Refill: 1    4. Mixed hyperlipidemia  Uncontrolled. Baby asa daily. Statin. - Lipid Panel; Future  - atorvastatin (LIPITOR) 40 MG tablet; Take 1 tablet by mouth daily  Dispense: 90 tablet; Refill: 1    5. Bipolar disorder in full remission, most recent episode unspecified type ([facility]  Stable, controlled on current regimen. - escitalopram (LEXAPRO) 5 MG tablet; Take 1 tablet by mouth daily  Dispense: 90 tablet; Refill: 1    6. Vaginal yeast infection  Stable, recent abx use and uncontrolled blood sugars. - fluconazole (DIFLUCAN) 150 MG tablet; Take 1 tablet by mouth once for 1 dose May repeat if symptoms not resolved in 72 hours  Dispense: 2 tablet; Refill: 0    7. Bilateral carpal tunnel syndrome  Uncontrolled  Reviewed splinting   Prn nsaids  Exercises provided  Discussed steroids- will wait due to uncontrolled DM  Discussed EMG and surgery - declined today.      The 10-year ASCVD risk score (Oswaldo Pineda., et al., 2013) is: 15%    Values used to calculate the score:      Age: 40 years      Sex: Female      Is Non- : No      Diabetic: Yes      Tobacco smoker: Yes      Systolic Blood Pressure: 293 mmHg      Is BP treated: Yes      HDL Cholesterol: 50 mg/dL      Total Cholesterol: 251 mg/dL    Declined immunizations  Reviewed risk of uncontrolled conditions in detail- verbalized understanding.    Follow up in 3 months    Electronically signed by ANASTASIA Kraft CNP on 8/28/2018 at 12:46 PM

## 2018-08-29 LAB
ESTIMATED AVERAGE GLUCOSE: 254.7 MG/DL
HBA1C MFR BLD: 10.5 %

## 2018-08-30 DIAGNOSIS — Z79.4 TYPE 2 DIABETES MELLITUS WITH COMPLICATION, WITH LONG-TERM CURRENT USE OF INSULIN (HCC): Primary | ICD-10-CM

## 2018-08-30 DIAGNOSIS — E11.8 TYPE 2 DIABETES MELLITUS WITH COMPLICATION, WITH LONG-TERM CURRENT USE OF INSULIN (HCC): Primary | ICD-10-CM

## 2018-08-31 DIAGNOSIS — Z79.4 TYPE 2 DIABETES MELLITUS WITH COMPLICATION, WITH LONG-TERM CURRENT USE OF INSULIN (HCC): Primary | ICD-10-CM

## 2018-08-31 DIAGNOSIS — E11.8 TYPE 2 DIABETES MELLITUS WITH COMPLICATION, WITH LONG-TERM CURRENT USE OF INSULIN (HCC): Primary | ICD-10-CM

## 2018-08-31 RX ORDER — GLUCOSAMINE HCL/CHONDROITIN SU 500-400 MG
CAPSULE ORAL
Qty: 300 STRIP | Refills: 0 | Status: SHIPPED | OUTPATIENT
Start: 2018-08-31 | End: 2019-01-17

## 2018-08-31 RX ORDER — LANCETS 30 GAUGE
EACH MISCELLANEOUS
Qty: 100 EACH | Refills: 3 | Status: SHIPPED | OUTPATIENT
Start: 2018-08-31 | End: 2019-01-17

## 2018-09-17 ENCOUNTER — TELEPHONE (OUTPATIENT)
Dept: FAMILY MEDICINE CLINIC | Age: 43
End: 2018-09-17

## 2018-09-17 DIAGNOSIS — Z79.4 TYPE 2 DIABETES MELLITUS WITH COMPLICATION, WITH LONG-TERM CURRENT USE OF INSULIN (HCC): Primary | ICD-10-CM

## 2018-09-17 DIAGNOSIS — E11.8 TYPE 2 DIABETES MELLITUS WITH COMPLICATION, WITH LONG-TERM CURRENT USE OF INSULIN (HCC): Primary | ICD-10-CM

## 2019-01-17 ENCOUNTER — OFFICE VISIT (OUTPATIENT)
Dept: FAMILY MEDICINE CLINIC | Age: 44
End: 2019-01-17
Payer: MEDICAID

## 2019-01-17 VITALS
HEIGHT: 65 IN | DIASTOLIC BLOOD PRESSURE: 108 MMHG | SYSTOLIC BLOOD PRESSURE: 170 MMHG | WEIGHT: 231.6 LBS | BODY MASS INDEX: 38.59 KG/M2 | HEART RATE: 97 BPM | OXYGEN SATURATION: 99 %

## 2019-01-17 DIAGNOSIS — A60.04 HERPES SIMPLEX VULVOVAGINITIS: ICD-10-CM

## 2019-01-17 DIAGNOSIS — R10.2 PELVIC PAIN: ICD-10-CM

## 2019-01-17 DIAGNOSIS — Z20.2 EXPOSURE TO STD: ICD-10-CM

## 2019-01-17 DIAGNOSIS — E78.2 MIXED HYPERLIPIDEMIA: ICD-10-CM

## 2019-01-17 DIAGNOSIS — F31.70 BIPOLAR DISORDER IN FULL REMISSION, MOST RECENT EPISODE UNSPECIFIED TYPE (HCC): ICD-10-CM

## 2019-01-17 DIAGNOSIS — I10 ESSENTIAL HYPERTENSION: ICD-10-CM

## 2019-01-17 DIAGNOSIS — Z87.42 HISTORY OF PID: ICD-10-CM

## 2019-01-17 DIAGNOSIS — G62.9 NEUROPATHY: ICD-10-CM

## 2019-01-17 DIAGNOSIS — N93.9 VAGINAL BLEEDING: ICD-10-CM

## 2019-01-17 DIAGNOSIS — Z79.4 TYPE 2 DIABETES MELLITUS WITH COMPLICATION, WITH LONG-TERM CURRENT USE OF INSULIN (HCC): Primary | ICD-10-CM

## 2019-01-17 DIAGNOSIS — E11.8 TYPE 2 DIABETES MELLITUS WITH COMPLICATION, WITH LONG-TERM CURRENT USE OF INSULIN (HCC): ICD-10-CM

## 2019-01-17 DIAGNOSIS — E78.00 PURE HYPERCHOLESTEROLEMIA: ICD-10-CM

## 2019-01-17 DIAGNOSIS — E11.8 TYPE 2 DIABETES MELLITUS WITH COMPLICATION, WITH LONG-TERM CURRENT USE OF INSULIN (HCC): Primary | ICD-10-CM

## 2019-01-17 DIAGNOSIS — Z79.4 TYPE 2 DIABETES MELLITUS WITH COMPLICATION, WITH LONG-TERM CURRENT USE OF INSULIN (HCC): ICD-10-CM

## 2019-01-17 DIAGNOSIS — F33.0 MILD EPISODE OF RECURRENT MAJOR DEPRESSIVE DISORDER (HCC): ICD-10-CM

## 2019-01-17 LAB
A/G RATIO: 1.3 (ref 1.1–2.2)
ALBUMIN SERPL-MCNC: 3.4 G/DL (ref 3.4–5)
ALP BLD-CCNC: 111 U/L (ref 40–129)
ALT SERPL-CCNC: 38 U/L (ref 10–40)
ANION GAP SERPL CALCULATED.3IONS-SCNC: 14 MMOL/L (ref 3–16)
AST SERPL-CCNC: 25 U/L (ref 15–37)
BASOPHILS ABSOLUTE: 0 K/UL (ref 0–0.2)
BASOPHILS RELATIVE PERCENT: 0.6 %
BILIRUB SERPL-MCNC: <0.2 MG/DL (ref 0–1)
BILIRUBIN URINE: NEGATIVE
BLOOD, URINE: ABNORMAL
BUN BLDV-MCNC: 26 MG/DL (ref 7–20)
CALCIUM SERPL-MCNC: 9.2 MG/DL (ref 8.3–10.6)
CHLORIDE BLD-SCNC: 101 MMOL/L (ref 99–110)
CHOLESTEROL, TOTAL: 245 MG/DL (ref 0–199)
CLARITY: ABNORMAL
CO2: 21 MMOL/L (ref 21–32)
COLOR: YELLOW
CREAT SERPL-MCNC: 1 MG/DL (ref 0.6–1.1)
CREATININE URINE: 30.8 MG/DL (ref 28–259)
EOSINOPHILS ABSOLUTE: 0.1 K/UL (ref 0–0.6)
EOSINOPHILS RELATIVE PERCENT: 1.1 %
EPITHELIAL CELLS, UA: 1 /HPF (ref 0–5)
GFR AFRICAN AMERICAN: >60
GFR NON-AFRICAN AMERICAN: >60
GLOBULIN: 2.6 G/DL
GLUCOSE BLD-MCNC: 342 MG/DL (ref 70–99)
GLUCOSE URINE: >=1000 MG/DL
HCT VFR BLD CALC: 36.5 % (ref 36–48)
HDLC SERPL-MCNC: 39 MG/DL (ref 40–60)
HEMOGLOBIN: 11.7 G/DL (ref 12–16)
HEPATITIS B CORE IGM ANTIBODY: NORMAL
HEPATITIS B SURFACE ANTIGEN INTERPRETATION: NORMAL
HEPATITIS C ANTIBODY INTERPRETATION: NORMAL
HYALINE CASTS: 2 /LPF (ref 0–8)
KETONES, URINE: NEGATIVE MG/DL
LDL CHOLESTEROL CALCULATED: ABNORMAL MG/DL
LDL CHOLESTEROL DIRECT: 155 MG/DL
LEUKOCYTE ESTERASE, URINE: NEGATIVE
LYMPHOCYTES ABSOLUTE: 1.9 K/UL (ref 1–5.1)
LYMPHOCYTES RELATIVE PERCENT: 38.2 %
MCH RBC QN AUTO: 27.5 PG (ref 26–34)
MCHC RBC AUTO-ENTMCNC: 32.1 G/DL (ref 31–36)
MCV RBC AUTO: 85.7 FL (ref 80–100)
MICROALBUMIN UR-MCNC: 196.8 MG/DL
MICROALBUMIN/CREAT UR-RTO: 6389.6 MG/G (ref 0–30)
MICROSCOPIC EXAMINATION: YES
MONOCYTES ABSOLUTE: 0.4 K/UL (ref 0–1.3)
MONOCYTES RELATIVE PERCENT: 8 %
NEUTROPHILS ABSOLUTE: 2.6 K/UL (ref 1.7–7.7)
NEUTROPHILS RELATIVE PERCENT: 52.1 %
NITRITE, URINE: NEGATIVE
PDW BLD-RTO: 16.8 % (ref 12.4–15.4)
PH UA: 6
PLATELET # BLD: 273 K/UL (ref 135–450)
PMV BLD AUTO: 9.4 FL (ref 5–10.5)
POTASSIUM SERPL-SCNC: 5 MMOL/L (ref 3.5–5.1)
PROTEIN UA: >=300 MG/DL
RBC # BLD: 4.25 M/UL (ref 4–5.2)
RBC UA: 4 /HPF (ref 0–4)
SODIUM BLD-SCNC: 136 MMOL/L (ref 136–145)
SPECIFIC GRAVITY UA: 1.02
TOTAL PROTEIN: 6 G/DL (ref 6.4–8.2)
TRIGL SERPL-MCNC: 445 MG/DL (ref 0–150)
UROBILINOGEN, URINE: 0.2 E.U./DL
VLDLC SERPL CALC-MCNC: ABNORMAL MG/DL
WBC # BLD: 4.9 K/UL (ref 4–11)
WBC UA: 2 /HPF (ref 0–5)

## 2019-01-17 PROCEDURE — 3046F HEMOGLOBIN A1C LEVEL >9.0%: CPT | Performed by: NURSE PRACTITIONER

## 2019-01-17 PROCEDURE — 99215 OFFICE O/P EST HI 40 MIN: CPT | Performed by: NURSE PRACTITIONER

## 2019-01-17 PROCEDURE — 4004F PT TOBACCO SCREEN RCVD TLK: CPT | Performed by: NURSE PRACTITIONER

## 2019-01-17 PROCEDURE — 2022F DILAT RTA XM EVC RTNOPTHY: CPT | Performed by: NURSE PRACTITIONER

## 2019-01-17 PROCEDURE — G8427 DOCREV CUR MEDS BY ELIG CLIN: HCPCS | Performed by: NURSE PRACTITIONER

## 2019-01-17 PROCEDURE — G8484 FLU IMMUNIZE NO ADMIN: HCPCS | Performed by: NURSE PRACTITIONER

## 2019-01-17 PROCEDURE — G8417 CALC BMI ABV UP PARAM F/U: HCPCS | Performed by: NURSE PRACTITIONER

## 2019-01-17 PROCEDURE — 81001 URINALYSIS AUTO W/SCOPE: CPT | Performed by: NURSE PRACTITIONER

## 2019-01-17 RX ORDER — BLOOD-GLUCOSE METER
1 KIT MISCELLANEOUS ONCE
Qty: 1 KIT | Refills: 0 | Status: SHIPPED | OUTPATIENT
Start: 2019-01-17 | End: 2019-07-08 | Stop reason: SDUPTHER

## 2019-01-17 RX ORDER — ASPIRIN 81 MG/1
81 TABLET ORAL DAILY
Qty: 30 TABLET | Refills: 3 | Status: SHIPPED | OUTPATIENT
Start: 2019-01-17 | End: 2019-07-01 | Stop reason: SDUPTHER

## 2019-01-17 RX ORDER — VALACYCLOVIR HYDROCHLORIDE 500 MG/1
500 TABLET, FILM COATED ORAL 2 TIMES DAILY PRN
Qty: 30 TABLET | Refills: 1 | Status: SHIPPED | OUTPATIENT
Start: 2019-01-17 | End: 2020-02-16

## 2019-01-17 RX ORDER — HYDROCHLOROTHIAZIDE 25 MG/1
25 TABLET ORAL DAILY
Qty: 90 TABLET | Refills: 1 | Status: SHIPPED | OUTPATIENT
Start: 2019-01-17 | End: 2019-06-13

## 2019-01-17 RX ORDER — LOSARTAN POTASSIUM 50 MG/1
50 TABLET ORAL DAILY
Qty: 90 TABLET | Refills: 1 | Status: SHIPPED | OUTPATIENT
Start: 2019-01-17 | End: 2019-11-21

## 2019-01-17 RX ORDER — ATORVASTATIN CALCIUM 40 MG/1
40 TABLET, FILM COATED ORAL DAILY
Qty: 90 TABLET | Refills: 1 | Status: SHIPPED | OUTPATIENT
Start: 2019-01-17 | End: 2019-06-13

## 2019-01-17 RX ORDER — ESCITALOPRAM OXALATE 5 MG/1
5 TABLET ORAL DAILY
Qty: 90 TABLET | Refills: 1 | Status: SHIPPED | OUTPATIENT
Start: 2019-01-17 | End: 2019-06-13

## 2019-01-17 ASSESSMENT — ENCOUNTER SYMPTOMS
APNEA: 0
NAUSEA: 0
CONSTIPATION: 0
BACK PAIN: 0
EYE PAIN: 0
WHEEZING: 0
SHORTNESS OF BREATH: 0
RHINORRHEA: 0
ABDOMINAL DISTENTION: 0
BLOOD IN STOOL: 0
CHEST TIGHTNESS: 0
COUGH: 0
EYE REDNESS: 0
SINUS PRESSURE: 0
VOMITING: 0
DIARRHEA: 0
ABDOMINAL PAIN: 0

## 2019-01-18 ENCOUNTER — TELEPHONE (OUTPATIENT)
Dept: FAMILY MEDICINE CLINIC | Age: 44
End: 2019-01-18

## 2019-01-18 LAB
CANDIDA SPECIES, DNA PROBE: ABNORMAL
ESTIMATED AVERAGE GLUCOSE: 294.8 MG/DL
GARDNERELLA VAGINALIS, DNA PROBE: ABNORMAL
HBA1C MFR BLD: 11.9 %
HIV AG/AB: NORMAL
HIV ANTIGEN: NORMAL
HIV-1 ANTIBODY: NORMAL
HIV-2 AB: NORMAL
TOTAL SYPHILLIS IGG/IGM: NORMAL
TRICHOMONAS VAGINALIS DNA: ABNORMAL

## 2019-01-18 RX ORDER — ALOGLIPTIN 12.5 MG/1
12.5 TABLET, FILM COATED ORAL DAILY
Qty: 90 TABLET | Refills: 0 | Status: SHIPPED | OUTPATIENT
Start: 2019-01-18 | End: 2019-06-13

## 2019-01-18 RX ORDER — METRONIDAZOLE 500 MG/1
500 TABLET ORAL 2 TIMES DAILY
Qty: 14 TABLET | Refills: 0 | Status: SHIPPED | OUTPATIENT
Start: 2019-01-18 | End: 2019-01-25

## 2019-01-20 LAB
C TRACH DNA GENITAL QL NAA+PROBE: NEGATIVE
N. GONORRHOEAE DNA: NEGATIVE

## 2019-02-07 DIAGNOSIS — R93.89 ABNORMAL MRI: Primary | ICD-10-CM

## 2019-03-22 ENCOUNTER — TELEPHONE (OUTPATIENT)
Dept: FAMILY MEDICINE CLINIC | Age: 44
End: 2019-03-22

## 2019-05-01 ENCOUNTER — TELEPHONE (OUTPATIENT)
Dept: FAMILY MEDICINE CLINIC | Age: 44
End: 2019-05-01

## 2019-05-01 NOTE — TELEPHONE ENCOUNTER
[phone] (home) [phone] (work)  Left VM at home number for patient to call to schedule ER follow up.

## 2019-06-13 ENCOUNTER — OFFICE VISIT (OUTPATIENT)
Dept: FAMILY MEDICINE CLINIC | Age: 44
End: 2019-06-13
Payer: MEDICARE

## 2019-06-13 ENCOUNTER — TELEPHONE (OUTPATIENT)
Dept: FAMILY MEDICINE CLINIC | Age: 44
End: 2019-06-13

## 2019-06-13 VITALS
BODY MASS INDEX: 34.16 KG/M2 | OXYGEN SATURATION: 99 % | SYSTOLIC BLOOD PRESSURE: 124 MMHG | WEIGHT: 205 LBS | HEART RATE: 77 BPM | HEIGHT: 65 IN | DIASTOLIC BLOOD PRESSURE: 72 MMHG

## 2019-06-13 DIAGNOSIS — S37.009A INJURY OF KIDNEY, UNSPECIFIED LATERALITY, INITIAL ENCOUNTER: ICD-10-CM

## 2019-06-13 DIAGNOSIS — E11.8 TYPE 2 DIABETES MELLITUS WITH COMPLICATION, WITH LONG-TERM CURRENT USE OF INSULIN (HCC): ICD-10-CM

## 2019-06-13 DIAGNOSIS — R31.21 ASYMPTOMATIC MICROSCOPIC HEMATURIA: ICD-10-CM

## 2019-06-13 DIAGNOSIS — Z79.4 TYPE 2 DIABETES MELLITUS WITH COMPLICATION, WITH LONG-TERM CURRENT USE OF INSULIN (HCC): ICD-10-CM

## 2019-06-13 DIAGNOSIS — I82.601 THROMBOSIS OF RIGHT UPPER EXTREMITY: Primary | ICD-10-CM

## 2019-06-13 DIAGNOSIS — I82.601 THROMBOSIS OF RIGHT UPPER EXTREMITY: ICD-10-CM

## 2019-06-13 LAB
BILIRUBIN URINE: NEGATIVE
BILIRUBIN, POC: NEGATIVE
BLOOD URINE, POC: ABNORMAL
BLOOD, URINE: ABNORMAL
CLARITY, POC: ABNORMAL
CLARITY: CLEAR
COLOR, POC: YELLOW
COLOR: YELLOW
EPITHELIAL CELLS, UA: 5 /HPF (ref 0–5)
GLUCOSE URINE, POC: NEGATIVE
GLUCOSE URINE: 100 MG/DL
HYALINE CASTS: 7 /LPF (ref 0–8)
INR BLD: 1.61
INR BLD: 1.61 (ref 0.86–1.14)
KETONES, POC: NEGATIVE
KETONES, URINE: NEGATIVE MG/DL
LEUKOCYTE EST, POC: NEGATIVE
LEUKOCYTE ESTERASE, URINE: NEGATIVE
MICROSCOPIC EXAMINATION: YES
NITRITE, POC: NEGATIVE
NITRITE, URINE: NEGATIVE
PH UA: 6 (ref 5–8)
PH, POC: 6
PROTEIN UA: >=300 MG/DL
PROTEIN, POC: ABNORMAL
PROTHROMBIN TIME: 18.3 SEC (ref 9.8–13)
RBC UA: 3 /HPF (ref 0–4)
SPECIFIC GRAVITY UA: 1.02 (ref 1–1.03)
SPECIFIC GRAVITY, POC: 1.02
UROBILINOGEN, POC: ABNORMAL
UROBILINOGEN, URINE: 1 E.U./DL
WBC UA: 3 /HPF (ref 0–5)

## 2019-06-13 PROCEDURE — 99214 OFFICE O/P EST MOD 30 MIN: CPT | Performed by: NURSE PRACTITIONER

## 2019-06-13 RX ORDER — CITALOPRAM 20 MG/1
20 TABLET ORAL DAILY
COMMUNITY
End: 2019-07-01 | Stop reason: SDUPTHER

## 2019-06-13 RX ORDER — CHLORTHALIDONE 25 MG/1
25 TABLET ORAL DAILY
COMMUNITY
End: 2019-07-01 | Stop reason: SDUPTHER

## 2019-06-13 RX ORDER — ATORVASTATIN CALCIUM 10 MG/1
10 TABLET, FILM COATED ORAL DAILY
COMMUNITY
End: 2019-07-01 | Stop reason: SDUPTHER

## 2019-06-13 RX ORDER — DILTIAZEM HYDROCHLORIDE 240 MG/1
240 CAPSULE, EXTENDED RELEASE ORAL DAILY
COMMUNITY
End: 2019-07-01 | Stop reason: SDUPTHER

## 2019-06-13 RX ORDER — METOPROLOL SUCCINATE 50 MG/1
50 TABLET, EXTENDED RELEASE ORAL DAILY
COMMUNITY
End: 2019-07-01 | Stop reason: SDUPTHER

## 2019-06-13 RX ORDER — WARFARIN SODIUM 3 MG/1
3 TABLET ORAL DAILY
COMMUNITY
End: 2019-09-27 | Stop reason: ALTCHOICE

## 2019-06-13 RX ORDER — METOCLOPRAMIDE 10 MG/1
10 TABLET ORAL 3 TIMES DAILY PRN
COMMUNITY
End: 2019-07-01 | Stop reason: SDUPTHER

## 2019-06-13 ASSESSMENT — ENCOUNTER SYMPTOMS
ABDOMINAL PAIN: 0
SHORTNESS OF BREATH: 0

## 2019-06-13 NOTE — PROGRESS NOTES
[facility] PHYSICIAN PRACTICES  [facility] PRIMARY CARE  [facility] Renate Kapadia  [facility] [address]: [phone]         2019     Candance Foreman (:  1975) is a 44 y.o. female, here for evaluation of the following medical concerns:    Chief Complaint   Patient presents with    Follow-Up from [facility], 5/10-, went in for a spider bite on upper right thigh. Has concerns if it is any better. \"looks closed but there is little gaps\"         HPI  Spider bite  Wound was growing  Was in the hospital x 6 weeks  Did IV abx/ PO abx, surgically cleaned out- finished with abx  Daily dressing changes    Blood clot in her arm  Reports it is from the hospital  Has not had her blood checked since she left  Not bleeding  Currently on Warfarin- unknown how long should be on it, unknown who is managing it  FH: [employer]    ANITA vs. CKD  Nephrology  Sees     BG  AM: , PM: 174- did not do insulin the morning  Was taken off the metformin before leaving    Review of Systems   Constitutional: Negative for activity change, appetite change, chills and fatigue. Respiratory: Negative for shortness of breath. Cardiovascular: Negative for chest pain. Gastrointestinal: Negative for abdominal pain. Genitourinary: Negative for difficulty urinating, dysuria and hematuria. Skin: Positive for wound. Neurological: Positive for weakness and headaches. Negative for dizziness and numbness. Hematological: Bruises/bleeds easily. Prior to Visit Medications    Medication Sig Taking?  Authorizing Provider   atorvastatin (LIPITOR) 10 MG tablet Take 10 mg by mouth daily Yes Historical Provider, MD   warfarin (COUMADIN) 3 MG tablet Take 3 mg by mouth daily Yes Historical Provider, MD   chlorthalidone (HYGROTON) 25 MG tablet Take 25 mg by mouth daily Yes Historical Provider, MD   citalopram (CELEXA) 20 MG tablet Take 20 mg by mouth daily Yes Historical Provider, MD   diltiazem (DILACOR XR) 240 MG extended release capsule Take 240 mg by mouth daily Yes Historical Provider, MD   metoclopramide (REGLAN) 10 MG tablet Take 10 mg by mouth 3 times daily as needed Yes Historical Provider, MD   metoprolol succinate (TOPROL XL) 50 MG extended release tablet Take 50 mg by mouth daily Yes Historical Provider, MD   aspirin EC 81 MG EC tablet Take 1 tablet by mouth daily Yes ANASTASIA Hoover CNP   Insulin Syringe-Needle U-100 (KROGER INS SYR .3CC/29G) 29G X 1/2\" 0.3 ML MISC 1 each by Does not apply route daily Yes ANASTASIA Hoover CNP   losartan (COZAAR) 50 MG tablet Take 1 tablet by mouth daily Yes ANASTASIA Hoover CNP   valACYclovir (VALTREX) 500 MG tablet Take 1 tablet by mouth 2 times daily as needed (herpes outbreak) Yes ANASTASIA Hoover CNP   blood glucose test strips (EXACTECH TEST) strip As needed. Yes ANASTASIA Carballo CNP   insulin NPH (HUMULIN N KWIKPEN) 100 UNIT/ML injection pen 5 units twice daily. Increase 2 units every 3-4 days until average blood sugar of 120. Yes ANASTASIA Bonilla CNP   Insulin Pen Needle (KROGER PEN NEEDLES 29G) 29G X 12MM MISC 1 each by Does not apply route daily Yes ANASTASIA Bonilla CNP   warfarin (COUMADIN) 1 MG tablet Take 1 tablet by mouth Twice a Week Take 1 tablet in addition to 3 mg tablet on Tuesday and Thursday  ANASTASIA Enrique CNP   glucose monitoring kit (FREESTYLE) monitoring kit 1 each by Does not apply route once for 1 dose  ANASTASIA Bonilla CNP        Social History     Tobacco Use    Smoking status: Light Tobacco Smoker    Smokeless tobacco: Never Used    Tobacco comment: occasionally   Substance Use Topics    Alcohol use:  Yes     Alcohol/week: 0.0 oz     Comment: socially        Vitals:    06/13/19 1401   BP: 124/72   Site: Left Upper Arm   Position: Sitting   Cuff Size: Large Adult   Pulse: 77   SpO2: 99%   Weight: 205 lb (93 kg)   Height: 5' 5\" (1.651 m)     Estimated

## 2019-06-13 NOTE — TELEPHONE ENCOUNTER
Pt called to say her employer would not accept her return to work letter. I faxed letter twice to the [phone] Leave of Absence Dept today at 3:28 per pt request. Faxed form scanned into chart.

## 2019-06-14 ENCOUNTER — ANTI-COAG VISIT (OUTPATIENT)
Dept: FAMILY MEDICINE CLINIC | Age: 44
End: 2019-06-14

## 2019-06-14 DIAGNOSIS — I82.601 THROMBOSIS OF RIGHT UPPER EXTREMITY: Primary | ICD-10-CM

## 2019-06-14 RX ORDER — WARFARIN SODIUM 1 MG/1
1 TABLET ORAL
Qty: 8 TABLET | Refills: 0 | Status: SHIPPED | OUTPATIENT
Start: 2019-06-17 | End: 2019-09-27 | Stop reason: ALTCHOICE

## 2019-06-14 NOTE — PROGRESS NOTES
Note sure if you are wanting to monitor or if she is going to anticoag clinic.      6/13/19  Protime 18.3  INR 1.61

## 2019-06-14 NOTE — PROGRESS NOTES
Patient enrolled w/ [facility]. Left message for patient to call back, need to know where she would like to go to have her coumadin monitored.

## 2019-06-17 ENCOUNTER — TELEPHONE (OUTPATIENT)
Dept: PHARMACY | Age: 44
End: 2019-06-17

## 2019-06-17 DIAGNOSIS — I82.611 SUPERFICIAL VENOUS THROMBOSIS OF ARM, RIGHT: Primary | ICD-10-CM

## 2019-06-17 NOTE — PROGRESS NOTES
Patient informed. States she wants to go somewhere close to here. I have locations pending for Tessa.

## 2019-06-17 NOTE — TELEPHONE ENCOUNTER
Received referral from CALIN Alonzo (sent e-referral to Dr. Constantin Ybarra for approval) to manage warfarin therapy. Called patient to schedule first appointment with [facility]. First appointment scheduled for Thursday, 6/20 @ 0915. Patient provided clinic address and phone number. Patient asked to bring insurance card and arrive 15 minutes early to first appointment. Notes:  -Patient is currently in the process of obtaining health insurance; she reports that [employer]  is working on this for her, but currently she is uninsured. Patient was told she could see medical providers and forward the bills to [employer] [facility] once she receives them. -Patient was found to have acute SVT in right distal cephalic vein on 3/93/08 during admission at [address] [place] [address].  Although I cannot find record of what dose of warfarin patient was receiving while inpatient, she reports the dose was 3 mg daily.    -Patient reports missing one dose of warfarin on 6/4, the day she was discharged home from the hospital.  Otherwise she has been taking 3 mg daily since discharge. Patient's INR was drawn on 6/13 and was 1.61. She was instructed by PCP's office on 6/14 to increase dose to 4 mg on Tues+Thurs, and 3 mg on all other days (now has 1 mg + 3 mg tablets). Baylee Higgins.  Carolann Gilbert, PharmD  [facility]  Ph: [phone]  6/17/2019 3:08 PM

## 2019-06-18 ENCOUNTER — TELEPHONE (OUTPATIENT)
Dept: FAMILY MEDICINE CLINIC | Age: 44
End: 2019-06-18

## 2019-06-18 DIAGNOSIS — I82.601 THROMBOSIS OF RIGHT UPPER EXTREMITY: Primary | ICD-10-CM

## 2019-06-20 ENCOUNTER — ANTI-COAG VISIT (OUTPATIENT)
Dept: PHARMACY | Age: 44
End: 2019-06-20
Payer: MEDICARE

## 2019-06-20 DIAGNOSIS — I82.611 SUPERFICIAL VENOUS THROMBOSIS OF ARM, RIGHT: ICD-10-CM

## 2019-06-20 LAB — INTERNATIONAL NORMALIZATION RATIO, POC: 1.7

## 2019-06-20 PROCEDURE — 85610 PROTHROMBIN TIME: CPT

## 2019-06-20 PROCEDURE — 99213 OFFICE O/P EST LOW 20 MIN: CPT

## 2019-06-20 NOTE — PROGRESS NOTES
ANTICOAGULATION SERVICE    Susan Foss is a 44 y.o. female with PMHx significant for acute SVT in R distal cephalic vein (4/67/37), DM2, HTN, HLD who presents to clinic 6/20/2019 for anticoagulation monitoring and adjustment.     Anticoagulation Indication(s):  RUE SVT    Referring Physician:  Dr. May Christian + Abeba Quiles, ANASTASIA-CNP  Goal INR Range:  2-3  Duration of Anticoagulation Therapy:  Unknown   Time of day dose taken:  PM  Product patient has at home:  warfarin 3 mg (tan), 1 mg (pink)      INR Summary                            Warfarin regimen (mg)  Date INR   A/P    Sun Mon Tue Wed Thu Fri Sat Mg/wk  6/20 1.7 Below goal, bolus x 2  3 3 4.5 3 6/4.5 6/3 3 24  6/13 1.61 Per PCP, increase  3 3 4.5 3 4.5 3 3 24  6/3 2.1 On discharge from [facility] 3 3 3 3 3 3 3 21    Last CBC:  Lab Results   Component Value Date    RBC 4.25 01/17/2019    HGB 11.7 (L) 01/17/2019    HCT 36.5 01/17/2019    MCV 85.7 01/17/2019    MCH 27.5 01/17/2019    MPV 9.4 01/17/2019    RDW 16.8 (H) 01/17/2019     01/17/2019       Patient History:  Recent hospitalizations/HC visits -5/10-6/4 Admit [facility] for rehab: diagnosed with acute SVT in right distal cephalic vein; started on warfarin  -5/5-5/10 [facility] for R thigh abscess: treated with IV ABX for MRSA; underwent I&D; required wound vac    Recent medication changes Med rec reviewed at first visit   Medications taken regularly that may interact with warfarin or alter INR ASA 81 mg   Warfarin dose taken as prescribed Missed doses on 6/4 and 6/19   Does not use pillbox currently; one was provided at first visit   Signs/symptoms of bleeding No h/o major bleeding reported   Vitamin K intake Normally has ~3 servings of green, leafy vegetables per week: usually eats salads with iceberg lettuce or mixed greens   Recent vomiting/diarrhea/fever, changes in weight or activity level None reported   Tobacco or alcohol use Patient reports quitting smoking ~2 years ago  Patient reports having 1 drink every 2-3 months (infrequent)   Upcoming surgeries or procedures Patient reports she will be having a lump removed from breast; surgery not scheduled yet     Assessment/Plan:  Patient's INR was subtherapeutic today (1.7). Patient missed her dose of warfarin yesterday due to unforseen weather circumstances. INR was also low on 6/13, potentially due to missed warfarin dose on 6/4. Warfarin was initiated during recent hospital admission for acute SVT on 5/13. INR was stable during admission, and warfarin dose on discharge was 3 mg daily, according to patient. As INR will likely drop even further due to missed dose yesterday, patient was instructed to take bolus doses of 6 mg today and tomorrow, then resume warfarin 4.5 mg on Tue+Thu, and 3 mg on all other days. Repeat INR in 4 days. Patient was reminded to maintain consistent vitamin K intake and call with any bleeding, medication changes, or fever/vomiting/diarrhea. As it was the first visit to [facility] for [facility], the following was also reviewed with the patient in detail:  · Reason for and intended duration of therapy  · INR goal and frequency of INR monitoring  · Medication Interactions: Call clinic if new any new medications are started--especially antibiotics. Avoid NSAIDs for pain. Use Tylenol instead. · Food-Drug Interactions: Foods high in vitamin K can change the effects of warfarin (decrease INR)--Stressed consistency with these foods. Reviewed a list of high vitamin K foods- mostly leafy green vegetables. · Side effects: May bruise easier and small cuts may take longer to clot than usual.  Seek medical attention for any cuts that won't stop bleeding or falls involving head injury. Any blood in the urine or stool should be reported immediately.   · Effect of alcohol and tobacco on INR  · Call the clinic with planned surgeries or procedures  · An information

## 2019-06-21 ENCOUNTER — TELEPHONE (OUTPATIENT)
Dept: PHARMACY | Age: 44
End: 2019-06-21

## 2019-06-24 ENCOUNTER — ANTI-COAG VISIT (OUTPATIENT)
Dept: PHARMACY | Age: 44
End: 2019-06-24
Payer: MEDICARE

## 2019-06-24 DIAGNOSIS — I82.611 SUPERFICIAL VENOUS THROMBOSIS OF ARM, RIGHT: ICD-10-CM

## 2019-06-24 LAB — INTERNATIONAL NORMALIZATION RATIO, POC: 2.4

## 2019-06-24 PROCEDURE — 99211 OFF/OP EST MAY X REQ PHY/QHP: CPT

## 2019-06-24 PROCEDURE — 85610 PROTHROMBIN TIME: CPT

## 2019-06-24 NOTE — PROGRESS NOTES
ANTICOAGULATION SERVICE    James Aguilar is a 44 y.o. female with PMHx significant for acute SVT in R distal cephalic vein ([birth year]), DM2, HTN, HLD who presents to clinic 6/24/2019 for anticoagulation monitoring and adjustment.     Anticoagulation Indication(s):  RUE SVT    Referring Physician:  Dr. Kasandra Qureshi + Shruthi Meals, APRN-CNP  Goal INR Range:  2-3  Duration of Anticoagulation Therapy:  Unknown   Time of day dose taken:  PM  Product patient has at home:  warfarin 3 mg (tan), 1 mg (pink)      INR Summary                            Warfarin regimen (mg)  Date INR   A/P    Sun Mon Tue Wed Thu Fri Sat Mg/wk  6/24 2.4 At goal, no change  3 3 4 3 4 3 3 23  6/20 1.7 Below goal, bolus x 2  3 3 4 3 6/4 6/3 3 23  6/13 1.61 Per PCP, increase  3 3 4 3 4 3 3 23  6/3 2.1 On discharge from [facility] 3 3 3 3 3 3 3 21    Last CBC:  Lab Results   Component Value Date    RBC 4.25 01/17/2019    HGB 11.7 (L) 01/17/2019    HCT 36.5 01/17/2019    MCV 85.7 01/17/2019    MCH 27.5 01/17/2019    MPV 9.4 01/17/2019    RDW 16.8 (H) 01/17/2019     01/17/2019       Patient History:  Recent hospitalizations/HC visits -6/20 [facility] ED for cellulitis  -5/10-6/4 Admit [facility] SURGERY for rehab: diagnosed with acute SVT in right distal cephalic vein; started on warfarin  -5/5-5/10 [facility] for R thigh abscess: treated with IV ABX for MRSA; underwent I&D; required wound vac    Recent medication changes -6/20 doxycyline BID x 10 days per ED  -Med rec reviewed at first visit   Medications taken regularly that may interact with warfarin or alter INR ASA 81 mg   Warfarin dose taken as prescribed Missed doses on 6/4 and 6/19   Does not use pillbox currently; one was provided at first visit   Signs/symptoms of bleeding No h/o major bleeding reported   Vitamin K intake Normally has ~3 servings of green, leafy vegetables per week: usually eats salads with iceberg lettuce or mixed greens   Recent vomiting/diarrhea/fever, changes in weight or activity level None reported   Tobacco or alcohol use Patient reports quitting smoking ~2 years ago  Patient reports having 1 drink every 2-3 months (infrequent)   Upcoming surgeries or procedures Patient reports she will be having a lump removed from breast; surgery not scheduled yet     Assessment/Plan:  Patient's INR was therapeutic today (2.4). Patient took warfarin as prescribed since last visit and denies any missed doses. Warfarin was initiated during recent hospital admission for acute SVT on 5/13. INR was stable during admission, and warfarin dose on discharge was 3 mg daily, according to patient. On 6/10, patient presented to [facility] ED for cellulitis, and was prescribed doxycyline. Doxycyline has potential to increase INR, but empiric warfarin dose reduction is not typically recommended. Patient was instructed to continue warfarin 4 mg on Tue+Thu, and 3 mg on all other days for now. Repeat INR in 4 days. Patient was reminded to maintain consistent vitamin K intake and call with any bleeding, medication changes, or fever/vomiting/diarrhea. Patient understands dosing directions and information discussed. Dosing schedule and follow up appointment given to patient. Progress note routed to referring physician's office. Patient acknowledges working in consult agreement with pharmacist as referred by his/her physician. Next Clinic Appointment:  6/28    Please call [facility] at [phone] with any questions. Thanks! Freddie Bolanos.  Cary Moody, PharmD  [facility]  Ph: [phone]  6/24/2019 10:42 AM

## 2019-06-27 NOTE — TELEPHONE ENCOUNTER
Spoke with patient. She is not at home and will call back when she has her list of medication in front of her.  Advised that the office closes at 5, but will reopen at 8am.

## 2019-06-27 NOTE — TELEPHONE ENCOUNTER
Please contact pt regarding refills. Pt is not sure which ones she needs. Rx's should go to [facility] in [facility].

## 2019-06-28 ENCOUNTER — ANTI-COAG VISIT (OUTPATIENT)
Dept: PHARMACY | Age: 44
End: 2019-06-28
Payer: MEDICARE

## 2019-06-28 ENCOUNTER — TELEPHONE (OUTPATIENT)
Dept: FAMILY MEDICINE CLINIC | Age: 44
End: 2019-06-28

## 2019-06-28 DIAGNOSIS — I82.611 SUPERFICIAL VENOUS THROMBOSIS OF ARM, RIGHT: ICD-10-CM

## 2019-06-28 LAB — INTERNATIONAL NORMALIZATION RATIO, POC: 2.2

## 2019-06-28 PROCEDURE — 99211 OFF/OP EST MAY X REQ PHY/QHP: CPT

## 2019-06-28 PROCEDURE — 85610 PROTHROMBIN TIME: CPT

## 2019-06-28 NOTE — TELEPHONE ENCOUNTER
Please fax [phone]. Please send pt's most recent labs to [facility] [address] Ne[facility].  Pt is scheduled to be seen there on Tuesday. They don't have any labs listed for her.

## 2019-06-28 NOTE — PROGRESS NOTES
ANTICOAGULATION SERVICE    Sharri Kapoor is a 44 y.o. female with PMHx significant for acute SVT in R distal cephalic vein (6/57/02), DM2, HTN, HLD who presents to clinic 6/28/2019 for anticoagulation monitoring and adjustment.     Anticoagulation Indication(s):  RUE SVT    Referring Physician:  Dr. Yuki Pineda + Stephanie Avila, ANASTASIA-CNP  Goal INR Range:  2-3  Duration of Anticoagulation Therapy:  Unknown   Time of day dose taken:  PM  Product patient has at home:  warfarin 3 mg (tan), 1 mg (pink)      INR Summary                            Warfarin regimen (mg)  Date INR   A/P    Sun Mon Tue Wed Thu Fri Sat Mg/wk  6/28 2.2 At goal, no change  3 3 4 3 4 3 3 23  6/24 2.4 At goal, no change  3 3 4 3 4 3 3 23  6/20 1.7 Below goal, bolus x 2  3 3 4 3 6/4 6/3 3 23  6/13 1.61 Per PCP, increase  3 3 4 3 4 3 3 23  6/3 2.1 On discharge from [facility] 3 3 3 3 3 3 3 21    Last CBC:  Lab Results   Component Value Date    RBC 4.25 01/17/2019    HGB 11.7 (L) 01/17/2019    HCT 36.5 01/17/2019    MCV 85.7 01/17/2019    MCH 27.5 01/17/2019    MPV 9.4 01/17/2019    RDW 16.8 (H) 01/17/2019     01/17/2019       Patient History:  Recent hospitalizations/HC visits -6/20 [facility] ED for cellulitis  -5/10-6/4 Admit [facility] for rehab: diagnosed with acute SVT in right distal cephalic vein; started on warfarin  -5/5-5/10 [facility] for R thigh abscess: treated with IV ABX for MRSA; underwent I&D; required wound vac    Recent medication changes -6/20 doxycyline BID x 10 days per ED  -Med rec reviewed at first visit   Medications taken regularly that may interact with warfarin or alter INR ASA 81 mg   Warfarin dose taken as prescribed Missed doses on 6/4 and 6/19   Does not use pillbox currently; one was provided at first visit   Signs/symptoms of bleeding No h/o major bleeding reported   Vitamin K intake Normally has ~3 servings of green, leafy vegetables per week: usually eats salads with iceberg lettuce or mixed greens   Recent vomiting/diarrhea/fever, changes in weight or activity level None reported   Tobacco or alcohol use Patient reports quitting smoking ~2 years ago  Patient reports having 1 drink every 2-3 months (infrequent)   Upcoming surgeries or procedures Patient reports she will be having a lump removed from breast; surgery not scheduled yet     Assessment/Plan:  Patient's INR was therapeutic today (2.2). Patient took warfarin as prescribed since last visit and denies any missed doses. Warfarin was initiated during recent hospital admission for acute SVT on 5/13. INR was stable during admission, and warfarin dose on discharge was 3 mg daily, according to patient. On 6/20, patient presented to [facility] ED for cellulitis, and was prescribed doxycyline. Doxycyline has potential to increase INR, but empiric warfarin dose reduction is not typically recommended. Patient was instructed to continue warfarin 4 mg on Tue+Thu, and 3 mg on all other days for now. Repeat INR in 4 days. Patient was reminded to maintain consistent vitamin K intake and call with any bleeding, medication changes, or fever/vomiting/diarrhea. Prescription called to pharmacy:  Pharmacy:  [facility] Phone:  [phone]   Warfarin strength:  3 mg + 1 mg Number of tablets:  30   Sig:  Take 3 mg daily, except 4 mg on Tue+Thu or as directed by clinic Refills:  3   Physician:  ANASTASIA Honeycutt      Patient understands dosing directions and information discussed. Dosing schedule and follow up appointment given to patient. Progress note routed to referring physician's office. Patient acknowledges working in consult agreement with pharmacist as referred by his/her physician. Next Clinic Appointment:  7/2    Please call [facility] at [phone] with any questions. Thanks! Freddie Bolanos.  Cary Moody, PharmD  [facility]  Ph: [phone]  6/28/2019 10:51 AM

## 2019-07-01 DIAGNOSIS — E78.00 PURE HYPERCHOLESTEROLEMIA: ICD-10-CM

## 2019-07-01 DIAGNOSIS — E78.2 MIXED HYPERLIPIDEMIA: ICD-10-CM

## 2019-07-01 NOTE — TELEPHONE ENCOUNTER
Refills, possibly new Rx's needed.       Reglan 10 mg  Albuterol HSA Inhaler  Asprin EC 81 mg  Lipitor 10 mg  Diltiazem  mg  Chlorthalidone 25 mg  Citalopram 20 mg  Toprol XL 50  Relion insulin Novolin N Vials    Send to [address] [facility] in [facility]

## 2019-07-02 RX ORDER — METAPROTERENOL SULFATE 10 MG/1
50 TABLET ORAL
COMMUNITY

## 2019-07-02 NOTE — TELEPHONE ENCOUNTER
Left message for patient     At discharge on 5/10/19: Who was prescribing metoclopramide, atorvastatin, metoprolol, citalopram, chlorthalidone and diltiazem? Some were on his discharge summary but was not prescribed at the hospital (diltiazem, metoprolol, atorvastatin and metoclopramide weren't on discharge summary)? And these were never prescribed here in the office? Who started her on the medication?

## 2019-07-03 ENCOUNTER — TELEPHONE (OUTPATIENT)
Dept: INTERNAL MEDICINE CLINIC | Age: 44
End: 2019-07-03

## 2019-07-03 NOTE — TELEPHONE ENCOUNTER
Hold until Alis Almodovar gets in on Friday. I am not sure if Alis Almodovar is doing this before they are seen in this office.

## 2019-07-04 RX ORDER — ASPIRIN 81 MG/1
81 TABLET ORAL DAILY
Qty: 90 TABLET | Refills: 0 | Status: SHIPPED | OUTPATIENT
Start: 2019-07-04 | End: 2019-09-27 | Stop reason: ALTCHOICE

## 2019-07-04 RX ORDER — METOPROLOL SUCCINATE 50 MG/1
50 TABLET, EXTENDED RELEASE ORAL DAILY
Qty: 90 TABLET | Refills: 0 | Status: SHIPPED | OUTPATIENT
Start: 2019-07-04 | End: 2019-10-07 | Stop reason: SDUPTHER

## 2019-07-04 RX ORDER — CITALOPRAM 20 MG/1
20 TABLET ORAL DAILY
Qty: 90 TABLET | Refills: 0 | Status: SHIPPED | OUTPATIENT
Start: 2019-07-04 | End: 2019-10-07 | Stop reason: SDUPTHER

## 2019-07-04 RX ORDER — CHLORTHALIDONE 25 MG/1
25 TABLET ORAL DAILY
Qty: 90 TABLET | Refills: 0 | Status: SHIPPED
Start: 2019-07-04 | End: 2020-02-16

## 2019-07-04 RX ORDER — DILTIAZEM HYDROCHLORIDE 240 MG/1
240 CAPSULE, EXTENDED RELEASE ORAL DAILY
Qty: 90 CAPSULE | Refills: 0 | Status: SHIPPED | OUTPATIENT
Start: 2019-07-04 | End: 2019-10-11 | Stop reason: SDUPTHER

## 2019-07-04 RX ORDER — ATORVASTATIN CALCIUM 10 MG/1
10 TABLET, FILM COATED ORAL DAILY
Qty: 90 TABLET | Refills: 0 | Status: SHIPPED | OUTPATIENT
Start: 2019-07-04 | End: 2019-10-15 | Stop reason: SDUPTHER

## 2019-07-04 RX ORDER — ALBUTEROL SULFATE 90 UG/1
2 AEROSOL, METERED RESPIRATORY (INHALATION) 4 TIMES DAILY PRN
Qty: 3 INHALER | Refills: 0 | Status: SHIPPED | OUTPATIENT
Start: 2019-07-04 | End: 2019-11-19 | Stop reason: SDUPTHER

## 2019-07-04 RX ORDER — METOCLOPRAMIDE 10 MG/1
10 TABLET ORAL 3 TIMES DAILY PRN
Qty: 90 TABLET | Refills: 0 | Status: SHIPPED | OUTPATIENT
Start: 2019-07-04 | End: 2019-09-06 | Stop reason: SDUPTHER

## 2019-07-08 DIAGNOSIS — E11.8 TYPE 2 DIABETES MELLITUS WITH COMPLICATION, WITH LONG-TERM CURRENT USE OF INSULIN (HCC): ICD-10-CM

## 2019-07-08 DIAGNOSIS — E11.8 TYPE 2 DIABETES MELLITUS WITH COMPLICATION, WITH LONG-TERM CURRENT USE OF INSULIN (HCC): Primary | ICD-10-CM

## 2019-07-08 DIAGNOSIS — Z79.4 TYPE 2 DIABETES MELLITUS WITH COMPLICATION, WITH LONG-TERM CURRENT USE OF INSULIN (HCC): ICD-10-CM

## 2019-07-08 DIAGNOSIS — Z79.4 TYPE 2 DIABETES MELLITUS WITH COMPLICATION, WITH LONG-TERM CURRENT USE OF INSULIN (HCC): Primary | ICD-10-CM

## 2019-07-08 RX ORDER — BLOOD-GLUCOSE METER
1 KIT MISCELLANEOUS ONCE
Qty: 1 KIT | Refills: 0 | Status: SHIPPED | OUTPATIENT
Start: 2019-07-08 | End: 2019-10-08 | Stop reason: SDUPTHER

## 2019-07-08 RX ORDER — LANCETS 30 GAUGE
1 EACH MISCELLANEOUS 2 TIMES DAILY
Qty: 100 EACH | Refills: 5 | Status: SHIPPED
Start: 2019-07-08 | End: 2020-02-16

## 2019-07-08 RX ORDER — GLUCOSAMINE HCL/CHONDROITIN SU 500-400 MG
CAPSULE ORAL
Qty: 100 STRIP | Refills: 0 | Status: SHIPPED | OUTPATIENT
Start: 2019-07-08 | End: 2019-09-06 | Stop reason: SDUPTHER

## 2019-07-08 NOTE — TELEPHONE ENCOUNTER
Please contact [facility] in [place] regarding the pt's test strips needing directions and a generic order needed for the glucometer. Pt's insurance will not cover the FreeStyle meter.

## 2019-07-10 ENCOUNTER — ANTI-COAG VISIT (OUTPATIENT)
Dept: PHARMACY | Age: 44
End: 2019-07-10
Payer: MEDICARE

## 2019-07-10 DIAGNOSIS — I82.611 SUPERFICIAL VENOUS THROMBOSIS OF ARM, RIGHT: ICD-10-CM

## 2019-07-10 LAB — INTERNATIONAL NORMALIZATION RATIO, POC: 1.6

## 2019-07-10 PROCEDURE — 85610 PROTHROMBIN TIME: CPT

## 2019-07-10 PROCEDURE — 99211 OFF/OP EST MAY X REQ PHY/QHP: CPT

## 2019-07-10 NOTE — PROGRESS NOTES
Vitamin K intake Normally has ~3 servings of green, leafy vegetables per week: usually eats salads with iceberg lettuce or mixed greens   Recent vomiting/diarrhea/fever, changes in weight or activity level None reported   Tobacco or alcohol use Patient reports quitting smoking ~2 years ago  Patient reports having 1 drink every 2-3 months (infrequent)   Upcoming surgeries or procedures Patient reports she will be having a lump removed from breast; surgery potentially scheduled 8/26? Assessment/Plan:  Patient's INR was subtherapeutic today (1.6), unchanged from last week. Patient took warfarin as prescribed since last visit and denies any missed doses. She reports that her vitamin K intake actually decreased since last visit, which would increase INR if anything. No med changes reported. Warfarin was initiated during recent hospital admission for acute SVT on 5/13. INR was stable during admission, and warfarin dose on discharge was 3 mg daily, according to patient. Patient was instructed to increase warfarin dose to 4 mg daily (12% increase). Repeat INR in 1 week. Patient was reminded to maintain consistent vitamin K intake and call with any bleeding, medication changes, or fever/vomiting/diarrhea. Patient understands dosing directions and information discussed. Dosing schedule and follow up appointment given to patient. Progress note routed to referring physician's office. Patient acknowledges working in consult agreement with pharmacist as referred by his/her physician. Next Clinic Appointment:  7/17    Please call [facility] at [phone] with any questions. Thanks! Leonora Humphreys.  Breanne Metz, PharmD  [facility]  Ph: [phone]  7/10/2019 9:23 AM

## 2019-07-11 DIAGNOSIS — Z79.4 TYPE 2 DIABETES MELLITUS WITH COMPLICATION, WITH LONG-TERM CURRENT USE OF INSULIN (HCC): ICD-10-CM

## 2019-07-11 DIAGNOSIS — E11.8 TYPE 2 DIABETES MELLITUS WITH COMPLICATION, WITH LONG-TERM CURRENT USE OF INSULIN (HCC): ICD-10-CM

## 2019-07-11 NOTE — TELEPHONE ENCOUNTER
Please send a prescription for pen needles to [address] [place] [address] in [facility] so she can use her diabetic supplies.

## 2019-07-26 ENCOUNTER — ANTI-COAG VISIT (OUTPATIENT)
Dept: PHARMACY | Age: 44
End: 2019-07-26
Payer: MEDICARE

## 2019-07-26 DIAGNOSIS — I82.611 SUPERFICIAL VENOUS THROMBOSIS OF ARM, RIGHT: ICD-10-CM

## 2019-07-26 LAB — INTERNATIONAL NORMALIZATION RATIO, POC: 1.2

## 2019-07-26 PROCEDURE — 85610 PROTHROMBIN TIME: CPT

## 2019-07-26 PROCEDURE — 99211 OFF/OP EST MAY X REQ PHY/QHP: CPT

## 2019-07-26 NOTE — PROGRESS NOTES
Signs/symptoms of bleeding No h/o major bleeding reported   Vitamin K intake 7/26 Patient not eating greens; she wants to eat them but she doesn't know how to measure them so is afraid of being inconsistent and will continue avoiding them. Normally has ~3 servings of green, leafy vegetables per week: usually eats salads with iceberg lettuce or mixed greens   Recent vomiting/diarrhea/fever, changes in weight or activity level Increase in activity level over past week or two    Tobacco or alcohol use Patient reports quitting smoking ~2 years ago  Patient reports having 1 drink every 2-3 months (infrequent)   Upcoming surgeries or procedures Patient reports she will be having a lump removed from breast; surgery potentially scheduled 8/26? Assessment/Plan:  Patient's INR was subtherapeutic today (1.2), despite increased dose at last visit. It is unknown why the INR keeps dropping. Patient reports some increase in activity level over the last couple weeks, but this should not have that big of an affect. Patient took warfarin as prescribed since last visit and denies any missed doses. She reports that she has been avoiding vitamin K intake, which would increase INR if anything. No med changes reported. Warfarin was initiated during recent hospital admission for acute SVT on 5/13. INR was stable during admission, and warfarin dose on discharge was 3 mg daily, according to patient. Patient was instructed to bolus with 8 mg today only, then increase warfarin dose to 6 mg on M/W/F and  4 mg daily on all other days. Repeat INR in 1 week. Patient was counseled to avoid greens until back in range. Patient was reminded to maintain consistent vitamin K intake and call with any bleeding, medication changes, or fever/vomiting/diarrhea. Patient understands dosing directions and information discussed. Dosing schedule and follow up appointment given to patient. Progress note routed to referring physician's office.

## 2019-07-31 ENCOUNTER — TELEPHONE (OUTPATIENT)
Dept: FAMILY MEDICINE CLINIC | Age: 44
End: 2019-07-31

## 2019-07-31 ENCOUNTER — ANTI-COAG VISIT (OUTPATIENT)
Dept: PHARMACY | Age: 44
End: 2019-07-31
Payer: MEDICARE

## 2019-07-31 DIAGNOSIS — I82.611 SUPERFICIAL VENOUS THROMBOSIS OF ARM, RIGHT: ICD-10-CM

## 2019-07-31 LAB — INTERNATIONAL NORMALIZATION RATIO, POC: 2.3

## 2019-07-31 PROCEDURE — 85610 PROTHROMBIN TIME: CPT

## 2019-07-31 PROCEDURE — 99211 OFF/OP EST MAY X REQ PHY/QHP: CPT

## 2019-07-31 NOTE — PROGRESS NOTES
Miriam Arceo is a 44 y.o. female with PMHx significant for acute SVT in R distal cephalic vein ([birth year]), DM2, HTN, HLD who presents to clinic 7/31/2019 for anticoagulation monitoring and adjustment.     Anticoagulation Indication(s):  RUE SVT    Referring Physician:  Dr. Letitia Dickens + ANASTASIA Chavez-CNP  Goal INR Range:  2-3  Duration of Anticoagulation Therapy:  Unknown (per referring MD, patient needs to see hematologist for hypercoag workup due to family h/o Factor V Leiden prior to stopping warfarin)  Time of day dose taken:  PM  Product patient has at home:  warfarin 3 mg (tan), 1 mg (pink)      INR Summary                            Warfarin regimen (mg)  Date INR   A/P    Sun Mon Tue Wed Thu Fri Sat Mg/wk  7/31 2.3 At goal, no change  4 6 4 6 4 6 4 34  7/26 1.2 Below goal, bolus/incease 4 6 4 6 4 8/6 4 34  7/10 1.6 Below goal, increase  4 4 4 4 4 4 4 28  7/2 1.6 Below goal, increase  4 3 4 3 4 3 4 25  6/28 2.2 At goal, no change  3 3 4 3 4 3 3 23  6/24 2.4 At goal, no change  3 3 4 3 4 3 3 23  6/20 1.7 Below goal, bolus x 2  3 3 4 3 6/4 6/3 3 23  6/13 1.61 Per PCP, increase  3 3 4 3 4 3 3 23  6/3 2.1 On discharge from [facility] 3 3 3 3 3 3 3 21    Last CBC:  Lab Results   Component Value Date    RBC 4.25 01/17/2019    HGB 11.7 (L) 01/17/2019    HCT 36.5 01/17/2019    MCV 85.7 01/17/2019    MCH 27.5 01/17/2019    MPV 9.4 01/17/2019    RDW 16.8 (H) 01/17/2019     01/17/2019       Patient History:  Recent hospitalizations/HC visits None since last visit  -6/20 [facility] ED for cellulitis  -5/10-6/4 Admit [facility] for rehab: diagnosed with acute SVT in right distal cephalic vein; started on warfarin  -5/5-5/10 [facility] for R thigh abscess: treated with IV ABX for MRSA; underwent I&D; required wound vac    Recent medication changes No change  -Med rec reviewed at first visit   Medications taken regularly that may interact with warfarin

## 2019-08-01 NOTE — TELEPHONE ENCOUNTER
I discussed with Dr. Genesis Candelaria a while ago. Sorry I thought she was going to schedule a follow up appointment. I'd like for her to f/u with hematology with the FH of Factor 5. I know patient thought hers was negative, however I couldn't find the documentation for it. Where would she like to go for hematology?   Thanks

## 2019-08-08 ENCOUNTER — ANTI-COAG VISIT (OUTPATIENT)
Dept: PHARMACY | Age: 44
End: 2019-08-08
Payer: MEDICARE

## 2019-08-08 DIAGNOSIS — I82.611 SUPERFICIAL VENOUS THROMBOSIS OF ARM, RIGHT: ICD-10-CM

## 2019-08-08 LAB — INTERNATIONAL NORMALIZATION RATIO, POC: 1.8

## 2019-08-08 PROCEDURE — 85610 PROTHROMBIN TIME: CPT

## 2019-08-08 PROCEDURE — 99211 OFF/OP EST MAY X REQ PHY/QHP: CPT

## 2019-08-09 NOTE — PROGRESS NOTES
[phone] (home) [phone] (work)  Left message on home phone.
Did she schedule with hematology yet? Please remind her this is really important.   Thanks
alter INR ASA 81 mg   Warfarin dose taken as prescribed Missed dose on 8/3  Does not use pillbox currently; one was provided at first visit   Signs/symptoms of bleeding No h/o major bleeding reported   Vitamin K intake Avoiding for now   Recent vomiting/diarrhea/fever, changes in weight or activity level None reported   Tobacco or alcohol use Patient reports quitting smoking ~2 years ago  Patient reports having 1 drink every 2-3 months (infrequent)   Upcoming surgeries or procedures Patient reports she will be having a lump removed from breast; surgery potentially scheduled 8/26? Assessment/Plan:  Patient's INR was subtherapeutic today (1.8) due to missed warfarin dose five days ago. No other changes reported today. Patient was instructed to continue warfarin 6 mg on MWF, and 4 mg on all other days. Repeat INR in 1 week. Patient was reminded to maintain consistent vitamin K intake and call with any bleeding, medication changes, or fever/vomiting/diarrhea. Patient understands dosing directions and information discussed. Dosing schedule and follow up appointment given to patient. Progress note routed to referring physician's office. Patient acknowledges working in consult agreement with pharmacist as referred by his/her physician. Next Clinic Appointment:  8/14    Please call [facility] at [phone] with any questions. Thanks! Jaye Farrar.  Jillian Phelps, PharmD  [facility]  Ph: [phone]  8/8/2019 12:07 PM

## 2019-08-21 ENCOUNTER — ANTI-COAG VISIT (OUTPATIENT)
Dept: PHARMACY | Age: 44
End: 2019-08-21
Payer: MEDICARE

## 2019-08-21 DIAGNOSIS — I82.611 SUPERFICIAL VENOUS THROMBOSIS OF ARM, RIGHT: ICD-10-CM

## 2019-08-21 LAB — INTERNATIONAL NORMALIZATION RATIO, POC: 2.5

## 2019-08-21 PROCEDURE — 99211 OFF/OP EST MAY X REQ PHY/QHP: CPT

## 2019-08-21 PROCEDURE — 85610 PROTHROMBIN TIME: CPT

## 2019-08-23 ENCOUNTER — TELEPHONE (OUTPATIENT)
Dept: FAMILY MEDICINE CLINIC | Age: 44
End: 2019-08-23

## 2019-08-28 ENCOUNTER — TELEPHONE (OUTPATIENT)
Dept: PHARMACY | Age: 44
End: 2019-08-28

## 2019-09-06 ENCOUNTER — ANTI-COAG VISIT (OUTPATIENT)
Dept: PHARMACY | Age: 44
End: 2019-09-06
Payer: MEDICARE

## 2019-09-06 DIAGNOSIS — I82.611 SUPERFICIAL VENOUS THROMBOSIS OF ARM, RIGHT: ICD-10-CM

## 2019-09-06 DIAGNOSIS — E11.8 TYPE 2 DIABETES MELLITUS WITH COMPLICATION, WITH LONG-TERM CURRENT USE OF INSULIN (HCC): ICD-10-CM

## 2019-09-06 DIAGNOSIS — Z79.4 TYPE 2 DIABETES MELLITUS WITH COMPLICATION, WITH LONG-TERM CURRENT USE OF INSULIN (HCC): ICD-10-CM

## 2019-09-06 LAB — INTERNATIONAL NORMALIZATION RATIO, POC: 3.9

## 2019-09-06 PROCEDURE — 85610 PROTHROMBIN TIME: CPT

## 2019-09-06 PROCEDURE — 99211 OFF/OP EST MAY X REQ PHY/QHP: CPT

## 2019-09-13 ENCOUNTER — TELEPHONE (OUTPATIENT)
Dept: FAMILY MEDICINE CLINIC | Age: 44
End: 2019-09-13

## 2019-09-13 RX ORDER — METOCLOPRAMIDE 10 MG/1
TABLET ORAL
Qty: 90 TABLET | Refills: 0 | Status: SHIPPED | OUTPATIENT
Start: 2019-09-13 | End: 2019-11-21 | Stop reason: ALTCHOICE

## 2019-09-13 RX ORDER — INSULIN HUMAN 100 [IU]/ML
INJECTION, SUSPENSION SUBCUTANEOUS
Qty: 12 PEN | Refills: 0 | Status: SHIPPED
Start: 2019-09-13 | End: 2020-02-14

## 2019-09-20 ENCOUNTER — TELEPHONE (OUTPATIENT)
Dept: PHARMACY | Age: 44
End: 2019-09-20

## 2019-09-27 ENCOUNTER — OFFICE VISIT (OUTPATIENT)
Dept: FAMILY MEDICINE CLINIC | Age: 44
End: 2019-09-27
Payer: MEDICARE

## 2019-09-27 VITALS
DIASTOLIC BLOOD PRESSURE: 86 MMHG | SYSTOLIC BLOOD PRESSURE: 152 MMHG | BODY MASS INDEX: 38.15 KG/M2 | HEIGHT: 65 IN | WEIGHT: 229 LBS | OXYGEN SATURATION: 99 % | HEART RATE: 75 BPM

## 2019-09-27 DIAGNOSIS — E11.8 TYPE 2 DIABETES MELLITUS WITH COMPLICATION, WITH LONG-TERM CURRENT USE OF INSULIN (HCC): ICD-10-CM

## 2019-09-27 DIAGNOSIS — Z79.4 TYPE 2 DIABETES MELLITUS WITH COMPLICATION, WITH LONG-TERM CURRENT USE OF INSULIN (HCC): ICD-10-CM

## 2019-09-27 DIAGNOSIS — L24.89 IRRITANT CONTACT DERMATITIS DUE TO OTHER AGENTS: Primary | ICD-10-CM

## 2019-09-27 DIAGNOSIS — I82.601 THROMBOSIS OF RIGHT UPPER EXTREMITY: ICD-10-CM

## 2019-09-27 LAB — HBA1C MFR BLD: 9.5 %

## 2019-09-27 PROCEDURE — 99214 OFFICE O/P EST MOD 30 MIN: CPT | Performed by: NURSE PRACTITIONER

## 2019-09-27 PROCEDURE — 83036 HEMOGLOBIN GLYCOSYLATED A1C: CPT | Performed by: NURSE PRACTITIONER

## 2019-09-27 PROCEDURE — 4004F PT TOBACCO SCREEN RCVD TLK: CPT | Performed by: NURSE PRACTITIONER

## 2019-09-27 PROCEDURE — 2022F DILAT RTA XM EVC RTNOPTHY: CPT | Performed by: NURSE PRACTITIONER

## 2019-09-27 PROCEDURE — G8427 DOCREV CUR MEDS BY ELIG CLIN: HCPCS | Performed by: NURSE PRACTITIONER

## 2019-09-27 PROCEDURE — G8417 CALC BMI ABV UP PARAM F/U: HCPCS | Performed by: NURSE PRACTITIONER

## 2019-09-27 PROCEDURE — 3046F HEMOGLOBIN A1C LEVEL >9.0%: CPT | Performed by: NURSE PRACTITIONER

## 2019-09-27 RX ORDER — DIAPER,BRIEF,INFANT-TODD,DISP
EACH MISCELLANEOUS
Qty: 1 TUBE | Refills: 1 | Status: SHIPPED | OUTPATIENT
Start: 2019-09-27 | End: 2019-10-04

## 2019-09-27 ASSESSMENT — ENCOUNTER SYMPTOMS
VOMITING: 0
RHINORRHEA: 0
NAUSEA: 0
ABDOMINAL PAIN: 0
SHORTNESS OF BREATH: 0
DIARRHEA: 0

## 2019-10-02 ENCOUNTER — ANTI-COAG VISIT (OUTPATIENT)
Dept: PHARMACY | Age: 44
End: 2019-10-02

## 2019-10-02 DIAGNOSIS — I82.611 SUPERFICIAL VENOUS THROMBOSIS OF ARM, RIGHT: ICD-10-CM

## 2019-10-07 RX ORDER — CITALOPRAM 20 MG/1
TABLET ORAL
Qty: 90 TABLET | Refills: 0 | Status: SHIPPED
Start: 2019-10-07 | End: 2020-02-16

## 2019-10-07 RX ORDER — METOPROLOL SUCCINATE 50 MG/1
TABLET, EXTENDED RELEASE ORAL
Qty: 90 TABLET | Refills: 0 | Status: SHIPPED
Start: 2019-10-07 | End: 2020-02-16

## 2019-10-08 ENCOUNTER — OFFICE VISIT (OUTPATIENT)
Dept: FAMILY MEDICINE CLINIC | Age: 44
End: 2019-10-08
Payer: MEDICARE

## 2019-10-08 VITALS
DIASTOLIC BLOOD PRESSURE: 92 MMHG | HEIGHT: 65 IN | WEIGHT: 227 LBS | SYSTOLIC BLOOD PRESSURE: 124 MMHG | OXYGEN SATURATION: 98 % | BODY MASS INDEX: 37.82 KG/M2 | HEART RATE: 84 BPM

## 2019-10-08 DIAGNOSIS — I82.601 THROMBOSIS OF RIGHT UPPER EXTREMITY: ICD-10-CM

## 2019-10-08 DIAGNOSIS — L24.89 IRRITANT CONTACT DERMATITIS DUE TO OTHER AGENTS: Primary | ICD-10-CM

## 2019-10-08 PROCEDURE — 4004F PT TOBACCO SCREEN RCVD TLK: CPT | Performed by: NURSE PRACTITIONER

## 2019-10-08 PROCEDURE — G8484 FLU IMMUNIZE NO ADMIN: HCPCS | Performed by: NURSE PRACTITIONER

## 2019-10-08 PROCEDURE — G8427 DOCREV CUR MEDS BY ELIG CLIN: HCPCS | Performed by: NURSE PRACTITIONER

## 2019-10-08 PROCEDURE — 99213 OFFICE O/P EST LOW 20 MIN: CPT | Performed by: NURSE PRACTITIONER

## 2019-10-08 PROCEDURE — G8417 CALC BMI ABV UP PARAM F/U: HCPCS | Performed by: NURSE PRACTITIONER

## 2019-10-08 RX ORDER — HYDROXYZINE PAMOATE 25 MG/1
25 CAPSULE ORAL 3 TIMES DAILY PRN
Qty: 30 CAPSULE | Refills: 0 | Status: SHIPPED | OUTPATIENT
Start: 2019-10-08 | End: 2019-10-18

## 2019-10-08 ASSESSMENT — ENCOUNTER SYMPTOMS: SHORTNESS OF BREATH: 0

## 2019-10-11 RX ORDER — DILTIAZEM HYDROCHLORIDE 240 MG/1
CAPSULE, EXTENDED RELEASE ORAL
Qty: 90 CAPSULE | Refills: 0 | Status: SHIPPED
Start: 2019-10-11 | End: 2020-02-16

## 2019-10-15 RX ORDER — ATORVASTATIN CALCIUM 10 MG/1
TABLET, FILM COATED ORAL
Qty: 90 TABLET | Refills: 0 | Status: SHIPPED
Start: 2019-10-15 | End: 2020-02-16

## 2019-10-29 ENCOUNTER — OFFICE VISIT (OUTPATIENT)
Dept: FAMILY MEDICINE CLINIC | Age: 44
End: 2019-10-29
Payer: MEDICARE

## 2019-10-29 VITALS
DIASTOLIC BLOOD PRESSURE: 88 MMHG | OXYGEN SATURATION: 98 % | SYSTOLIC BLOOD PRESSURE: 132 MMHG | BODY MASS INDEX: 36.99 KG/M2 | WEIGHT: 222 LBS | HEART RATE: 83 BPM | HEIGHT: 65 IN

## 2019-10-29 DIAGNOSIS — M54.50 ACUTE MIDLINE LOW BACK PAIN WITHOUT SCIATICA: ICD-10-CM

## 2019-10-29 DIAGNOSIS — L02.91 ABSCESS: Primary | ICD-10-CM

## 2019-10-29 PROCEDURE — 4004F PT TOBACCO SCREEN RCVD TLK: CPT | Performed by: NURSE PRACTITIONER

## 2019-10-29 PROCEDURE — G8417 CALC BMI ABV UP PARAM F/U: HCPCS | Performed by: NURSE PRACTITIONER

## 2019-10-29 PROCEDURE — G8484 FLU IMMUNIZE NO ADMIN: HCPCS | Performed by: NURSE PRACTITIONER

## 2019-10-29 PROCEDURE — G8427 DOCREV CUR MEDS BY ELIG CLIN: HCPCS | Performed by: NURSE PRACTITIONER

## 2019-10-29 PROCEDURE — 99213 OFFICE O/P EST LOW 20 MIN: CPT | Performed by: NURSE PRACTITIONER

## 2019-10-29 RX ORDER — CYCLOBENZAPRINE HCL 5 MG
5 TABLET ORAL 3 TIMES DAILY PRN
Qty: 30 TABLET | Refills: 0 | Status: SHIPPED | OUTPATIENT
Start: 2019-10-29 | End: 2019-11-08

## 2019-10-29 RX ORDER — LIDOCAINE 50 MG/G
1 PATCH TOPICAL DAILY
Qty: 10 PATCH | Refills: 0 | Status: SHIPPED | OUTPATIENT
Start: 2019-10-29 | End: 2019-11-08

## 2019-10-29 ASSESSMENT — ENCOUNTER SYMPTOMS
BACK PAIN: 1
SHORTNESS OF BREATH: 0
ROS SKIN COMMENTS: ABSCESS

## 2019-11-04 ENCOUNTER — TELEPHONE (OUTPATIENT)
Dept: FAMILY MEDICINE CLINIC | Age: 44
End: 2019-11-04

## 2019-11-04 RX ORDER — FLUCONAZOLE 150 MG/1
150 TABLET ORAL ONCE
Qty: 1 TABLET | Refills: 0 | Status: SHIPPED | OUTPATIENT
Start: 2019-11-04 | End: 2019-11-04

## 2019-11-15 ENCOUNTER — TELEPHONE (OUTPATIENT)
Dept: FAMILY MEDICINE CLINIC | Age: 44
End: 2019-11-15

## 2019-11-19 DIAGNOSIS — E78.2 MIXED HYPERLIPIDEMIA: ICD-10-CM

## 2019-11-19 DIAGNOSIS — E78.00 PURE HYPERCHOLESTEROLEMIA: ICD-10-CM

## 2019-11-21 ENCOUNTER — OFFICE VISIT (OUTPATIENT)
Dept: FAMILY MEDICINE CLINIC | Age: 44
End: 2019-11-21
Payer: MEDICARE

## 2019-11-21 VITALS
HEIGHT: 65 IN | DIASTOLIC BLOOD PRESSURE: 84 MMHG | HEART RATE: 99 BPM | OXYGEN SATURATION: 98 % | SYSTOLIC BLOOD PRESSURE: 118 MMHG | WEIGHT: 228 LBS | BODY MASS INDEX: 37.99 KG/M2

## 2019-11-21 DIAGNOSIS — F31.60 BIPOLAR AFFECTIVE DISORDER, CURRENT EPISODE MIXED, CURRENT EPISODE SEVERITY UNSPECIFIED (HCC): ICD-10-CM

## 2019-11-21 DIAGNOSIS — Z11.3 SCREENING FOR STD (SEXUALLY TRANSMITTED DISEASE): ICD-10-CM

## 2019-11-21 DIAGNOSIS — N89.8 VAGINAL DISCHARGE: ICD-10-CM

## 2019-11-21 DIAGNOSIS — M79.672 LEFT FOOT PAIN: Primary | ICD-10-CM

## 2019-11-21 PROCEDURE — G8417 CALC BMI ABV UP PARAM F/U: HCPCS | Performed by: NURSE PRACTITIONER

## 2019-11-21 PROCEDURE — G8427 DOCREV CUR MEDS BY ELIG CLIN: HCPCS | Performed by: NURSE PRACTITIONER

## 2019-11-21 PROCEDURE — 99213 OFFICE O/P EST LOW 20 MIN: CPT | Performed by: NURSE PRACTITIONER

## 2019-11-21 PROCEDURE — 4004F PT TOBACCO SCREEN RCVD TLK: CPT | Performed by: NURSE PRACTITIONER

## 2019-11-21 PROCEDURE — G8484 FLU IMMUNIZE NO ADMIN: HCPCS | Performed by: NURSE PRACTITIONER

## 2019-11-21 RX ORDER — ARIPIPRAZOLE 10 MG/1
10 TABLET ORAL DAILY
Qty: 30 TABLET | Refills: 3 | Status: SHIPPED
Start: 2019-11-21 | End: 2020-02-16

## 2019-11-21 RX ORDER — HYDROGEN PEROXIDE 2.65 ML/100ML
LIQUID ORAL; TOPICAL
Qty: 90 TABLET | Refills: 0 | OUTPATIENT
Start: 2019-11-21

## 2019-11-21 RX ORDER — ALBUTEROL SULFATE 90 UG/1
AEROSOL, METERED RESPIRATORY (INHALATION)
Qty: 1 INHALER | Refills: 2 | Status: SHIPPED
Start: 2019-11-21 | End: 2020-02-16

## 2019-11-22 LAB
C TRACH DNA GENITAL QL NAA+PROBE: NEGATIVE
CANDIDA SPECIES, DNA PROBE: NORMAL
GARDNERELLA VAGINALIS, DNA PROBE: NORMAL
N. GONORRHOEAE DNA: NEGATIVE
TRICHOMONAS VAGINALIS DNA: NORMAL

## 2019-11-22 ASSESSMENT — ENCOUNTER SYMPTOMS
SHORTNESS OF BREATH: 0
ABDOMINAL PAIN: 0

## 2020-02-14 ENCOUNTER — OFFICE VISIT (OUTPATIENT)
Dept: FAMILY MEDICINE CLINIC | Age: 45
End: 2020-02-14

## 2020-02-14 ENCOUNTER — TELEPHONE (OUTPATIENT)
Dept: FAMILY MEDICINE CLINIC | Age: 45
End: 2020-02-14

## 2020-02-14 VITALS
SYSTOLIC BLOOD PRESSURE: 138 MMHG | HEART RATE: 82 BPM | WEIGHT: 218 LBS | HEIGHT: 65 IN | DIASTOLIC BLOOD PRESSURE: 96 MMHG | BODY MASS INDEX: 36.32 KG/M2 | OXYGEN SATURATION: 97 %

## 2020-02-14 PROCEDURE — G8484 FLU IMMUNIZE NO ADMIN: HCPCS | Performed by: NURSE PRACTITIONER

## 2020-02-14 PROCEDURE — 4004F PT TOBACCO SCREEN RCVD TLK: CPT | Performed by: NURSE PRACTITIONER

## 2020-02-14 PROCEDURE — G8427 DOCREV CUR MEDS BY ELIG CLIN: HCPCS | Performed by: NURSE PRACTITIONER

## 2020-02-14 PROCEDURE — G8417 CALC BMI ABV UP PARAM F/U: HCPCS | Performed by: NURSE PRACTITIONER

## 2020-02-14 PROCEDURE — 3046F HEMOGLOBIN A1C LEVEL >9.0%: CPT | Performed by: NURSE PRACTITIONER

## 2020-02-14 PROCEDURE — 99213 OFFICE O/P EST LOW 20 MIN: CPT | Performed by: NURSE PRACTITIONER

## 2020-02-14 PROCEDURE — 2022F DILAT RTA XM EVC RTNOPTHY: CPT | Performed by: NURSE PRACTITIONER

## 2020-02-14 RX ORDER — DOXYCYCLINE HYCLATE 100 MG/1
CAPSULE ORAL
COMMUNITY
Start: 2020-02-06

## 2020-02-14 ASSESSMENT — ENCOUNTER SYMPTOMS
NAUSEA: 0
ABDOMINAL PAIN: 0
SHORTNESS OF BREATH: 0
VOMITING: 0
DIARRHEA: 0

## 2020-02-14 NOTE — PROGRESS NOTES
[facility] PRIMARY CARE  [address] [place] [address]: [phone]         2020     Stephen Hatchet (:  1975) is a 38 y.o. female, here for evaluation of the following medical concerns:    Chief Complaint   Patient presents with    Follow-Up from Hospital     needs work note , , ,         HPI   Admitted 2020- 2020  Left leg abscess- I&D in surgery; was on IV abx d/t MRSA bacteremia  Before discharge got insulin, BP medication, and abx- paid out of pocket  Was started on doxycycline  Took PICC line out 2/10; want to put another in but cannot schedule to get it done  Is suppose to see on Tuesday to discuss with ID    DM  Does not have insulin  BGs 124-170  Trying to manage with diet  Was on Humulin N- was doing 30 units at night and sliding scale for other    Declines refill of medications d/t cost  Is not working enough hours for insurance to be covered, makes too much money for medicaid  Is moving to [place] on     Review of Systems   Constitutional: Negative for appetite change, chills and fever. Respiratory: Negative for shortness of breath. Cardiovascular: Negative for chest pain. Gastrointestinal: Negative for abdominal pain, diarrhea, nausea and vomiting. Skin: Positive for wound. Neurological: Negative for dizziness and headaches. Prior to Visit Medications    Medication Sig Taking?  Authorizing Provider   doxycycline hyclate (VIBRAMYCIN) 100 MG capsule TAKE 1 CAPSULE BY MOUTH TWICE DAILY Yes Historical Provider, MD   Insulin Degludec (TRESIBA FLEXTOUCH) 100 UNIT/ML SOPN Inject 10 Units into the skin nightly Yes Cordelia Kohler APRN - CNP   metaproterenol (ALUPENT) 10 MG tablet Take 50 mg by mouth  Historical Provider, MD        Social History     Tobacco Use    Smoking status: Light Tobacco Smoker    Smokeless tobacco: Never Used    Tobacco comment: occasionally   Substance Use Topics    Alcohol use: Yes     Alcohol/week: 0.0 standard drinks     Comment: socially        Vitals:    02/14/20 1616   BP: (!) 138/96   Site: Left Upper Arm   Position: Sitting   Cuff Size: Large Adult   Pulse: 82   SpO2: 97%   Weight: 218 lb (98.9 kg)   Height: 5' 5\" (1.651 m)     Estimated body mass index is 36.28 kg/m² as calculated from the following:    Height as of this encounter: 5' 5\" (1.651 m). Weight as of this encounter: 218 lb (98.9 kg). Physical Exam  Vitals signs reviewed. Constitutional:       Appearance: Normal appearance. HENT:      Head: Normocephalic. Cardiovascular:      Rate and Rhythm: Normal rate and regular rhythm. Pulses: Normal pulses. Heart sounds: Normal heart sounds, S1 normal and S2 normal.   Pulmonary:      Effort: Pulmonary effort is normal.      Breath sounds: Normal breath sounds and air entry. Abdominal:      Palpations: Abdomen is soft. Tenderness: There is no abdominal tenderness. Skin:     Findings: Lesion present. Comments: + purulent drainage from larger lesions; small lesion with induration, no erythema or drainage   Neurological:      Mental Status: She is alert. Psychiatric:         Mood and Affect: Mood normal. Affect is tearful. ASSESSMENT/PLAN:  1. Abscess  Stable;  Continue abx as recommended. Discussed patient has a new pustule. Patient to monitor closely and try warm compresses. Continue to keep covered. Follow recommendations from ID. 2. Type 2 diabetes mellitus with complication (HCC)  Stable:  Discussed BG's are ok, however patient reports she is not eating much. Samples of tresiba given to begin is BGs > 140. Patient declines metformin refill. Patient to monitor closely from S&S of hypoglycemia. Call if BG < 70 or > 400.  - Insulin Degludec (TRESIBA FLEXTOUCH) 100 UNIT/ML SOPN; Inject 10 Units into the skin nightly  Dispense: 3 pen; Refill: 0    3.  History of MRSA infection  Stable;  Continue to follow

## 2020-02-17 ENCOUNTER — TELEPHONE (OUTPATIENT)
Dept: FAMILY MEDICINE CLINIC | Age: 45
End: 2020-02-17

## 2020-02-17 NOTE — TELEPHONE ENCOUNTER
Pt need to speak with someone about a doctor note that she received 02/14/2020.  Please contact pt at [phone]

## 2020-02-18 NOTE — TELEPHONE ENCOUNTER
Patient gave 2 week notice at work due to having to move back to [place] for medical reasons. Requesting that Aide Brooks write a letter saying that she needs to quit her job effective immediately due to [employer] - so that if she moves back, she will be rehirable at that job. Would need letter by Thursday or Friday if posisble.

## 2020-12-03 ENCOUNTER — TELEPHONE (OUTPATIENT)
Dept: FAMILY MEDICINE CLINIC | Age: 45
End: 2020-12-03

## 2020-12-08 NOTE — TELEPHONE ENCOUNTER
Left message for patient to call back.      **Emeka Rangel is no longer PCP - patient moved to [place] on 2/21